# Patient Record
Sex: MALE | Race: WHITE
[De-identification: names, ages, dates, MRNs, and addresses within clinical notes are randomized per-mention and may not be internally consistent; named-entity substitution may affect disease eponyms.]

---

## 2019-08-01 ENCOUNTER — HOSPITAL ENCOUNTER (INPATIENT)
Dept: HOSPITAL 91 - E/R | Age: 59
LOS: 5 days | Discharge: HOME HEALTH | DRG: 264 | End: 2019-08-06
Payer: COMMERCIAL

## 2019-08-01 ENCOUNTER — HOSPITAL ENCOUNTER (INPATIENT)
Dept: HOSPITAL 10 - E/R | Age: 59
LOS: 5 days | Discharge: HOME HEALTH | DRG: 264 | End: 2019-08-06
Attending: INTERNAL MEDICINE | Admitting: INTERNAL MEDICINE
Payer: COMMERCIAL

## 2019-08-01 VITALS
HEIGHT: 69 IN | WEIGHT: 235.89 LBS | HEIGHT: 69 IN | BODY MASS INDEX: 34.94 KG/M2 | WEIGHT: 235.89 LBS | BODY MASS INDEX: 34.94 KG/M2

## 2019-08-01 VITALS — HEART RATE: 100 BPM | SYSTOLIC BLOOD PRESSURE: 143 MMHG | DIASTOLIC BLOOD PRESSURE: 63 MMHG | RESPIRATION RATE: 18 BRPM

## 2019-08-01 DIAGNOSIS — E78.5: ICD-10-CM

## 2019-08-01 DIAGNOSIS — E11.8: ICD-10-CM

## 2019-08-01 DIAGNOSIS — N39.0: ICD-10-CM

## 2019-08-01 DIAGNOSIS — L03.115: ICD-10-CM

## 2019-08-01 DIAGNOSIS — E11.40: ICD-10-CM

## 2019-08-01 DIAGNOSIS — E87.5: ICD-10-CM

## 2019-08-01 DIAGNOSIS — D63.8: ICD-10-CM

## 2019-08-01 DIAGNOSIS — Z99.2: ICD-10-CM

## 2019-08-01 DIAGNOSIS — E66.9: ICD-10-CM

## 2019-08-01 DIAGNOSIS — E11.22: ICD-10-CM

## 2019-08-01 DIAGNOSIS — I73.9: ICD-10-CM

## 2019-08-01 DIAGNOSIS — N18.6: ICD-10-CM

## 2019-08-01 DIAGNOSIS — E11.621: ICD-10-CM

## 2019-08-01 DIAGNOSIS — E11.52: Primary | ICD-10-CM

## 2019-08-01 DIAGNOSIS — I12.0: ICD-10-CM

## 2019-08-01 LAB
ADD MAN DIFF?: NO
ADD UMIC: YES
ALANINE AMINOTRANSFERASE: 57 IU/L (ref 13–69)
ALBUMIN/GLOBULIN RATIO: 1.15
ALBUMIN: 4.6 G/DL (ref 3.3–4.9)
ALKALINE PHOSPHATASE: 127 IU/L (ref 42–121)
ANION GAP: 17 (ref 5–13)
ASPARTATE AMINO TRANSFERASE: 37 IU/L (ref 15–46)
BASOPHIL #: 0 10^3/UL (ref 0–0.1)
BASOPHILS %: 0.3 % (ref 0–2)
BILIRUBIN,DIRECT: 0 MG/DL (ref 0–0.2)
BILIRUBIN,TOTAL: 0.2 MG/DL (ref 0.2–1.3)
BLOOD UREA NITROGEN: 58 MG/DL (ref 7–20)
CALCIUM: 9.8 MG/DL (ref 8.4–10.2)
CARBON DIOXIDE: 22 MMOL/L (ref 21–31)
CHLORIDE: 99 MMOL/L (ref 97–110)
CREATININE: 7.48 MG/DL (ref 0.61–1.24)
EOSINOPHILS #: 0.2 10^3/UL (ref 0–0.5)
EOSINOPHILS %: 1.9 % (ref 0–7)
GLOBULIN: 4 G/DL (ref 1.3–3.2)
GLUCOSE: 229 MG/DL (ref 70–220)
HEMATOCRIT: 32.1 % (ref 42–52)
HEMOGLOBIN: 10.6 G/DL (ref 14–18)
IMMATURE GRANS #M: 0.23 10^3/UL (ref 0–0.03)
IMMATURE GRANS % (M): 2.4 % (ref 0–0.43)
INR: 0.94
LYMPHOCYTES #: 1.2 10^3/UL (ref 0.8–2.9)
LYMPHOCYTES %: 12.7 % (ref 15–51)
MEAN CORPUSCULAR HEMOGLOBIN: 30.5 PG (ref 29–33)
MEAN CORPUSCULAR HGB CONC: 33 G/DL (ref 32–37)
MEAN CORPUSCULAR VOLUME: 92.2 FL (ref 82–101)
MEAN PLATELET VOLUME: 9.8 FL (ref 7.4–10.4)
MONOCYTE #: 1.4 10^3/UL (ref 0.3–0.9)
MONOCYTES %: 15.1 % (ref 0–11)
NEUTROPHIL #: 6.4 10^3/UL (ref 1.6–7.5)
NEUTROPHILS %: 67.6 % (ref 39–77)
NUCLEATED RED BLOOD CELLS #: 0 10^3/UL (ref 0–0)
NUCLEATED RED BLOOD CELLS%: 0 /100WBC (ref 0–0)
PARTIAL THROMBOPLASTIN TIME: 30.9 SEC (ref 23–35)
PLATELET COUNT: 259 10^3/UL (ref 140–415)
POTASSIUM: 4.9 MMOL/L (ref 3.5–5.1)
PROTIME: 12.7 SEC (ref 11.9–14.9)
PT RATIO: 1
RED BLOOD COUNT: 3.48 10^6/UL (ref 4.7–6.1)
RED CELL DISTRIBUTION WIDTH: 14.5 % (ref 11.5–14.5)
SODIUM: 138 MMOL/L (ref 135–144)
TOTAL PROTEIN: 8.6 G/DL (ref 6.1–8.1)
UR AMORPHOUS CRYSTAL: (no result) /HPF
UR ASCORBIC ACID: NEGATIVE MG/DL
UR BACTERIA: (no result) /HPF
UR BILIRUBIN (DIP): NEGATIVE MG/DL
UR BLOOD (DIP): (no result) MG/DL
UR CLARITY: (no result)
UR COLOR: YELLOW
UR GLUCOSE (DIP): (no result) MG/DL
UR KETONES (DIP): NEGATIVE MG/DL
UR LEUKOCYTE ESTERASE (DIP): NEGATIVE LEU/UL
UR NITRITE (DIP): NEGATIVE MG/DL
UR PH (DIP): 5 (ref 5–9)
UR RBC: 15 /HPF (ref 0–5)
UR SPECIFIC GRAVITY (DIP): 1.02 (ref 1–1.03)
UR SQUAMOUS EPITHELIAL CELL: (no result) /HPF
UR TOTAL PROTEIN (DIP): (no result) MG/DL
UR UROBILINOGEN (DIP): NEGATIVE MG/DL
UR WBC: 6 /HPF (ref 0–5)
WHITE BLOOD COUNT: 9.5 10^3/UL (ref 4.8–10.8)

## 2019-08-01 PROCEDURE — 81001 URINALYSIS AUTO W/SCOPE: CPT

## 2019-08-01 PROCEDURE — 73630 X-RAY EXAM OF FOOT: CPT

## 2019-08-01 PROCEDURE — 96375 TX/PRO/DX INJ NEW DRUG ADDON: CPT

## 2019-08-01 PROCEDURE — 80202 ASSAY OF VANCOMYCIN: CPT

## 2019-08-01 PROCEDURE — 80048 BASIC METABOLIC PNL TOTAL CA: CPT

## 2019-08-01 PROCEDURE — 87340 HEPATITIS B SURFACE AG IA: CPT

## 2019-08-01 PROCEDURE — 85025 COMPLETE CBC W/AUTO DIFF WBC: CPT

## 2019-08-01 PROCEDURE — 73718 MRI LOWER EXTREMITY W/O DYE: CPT

## 2019-08-01 PROCEDURE — 83540 ASSAY OF IRON: CPT

## 2019-08-01 PROCEDURE — 83036 HEMOGLOBIN GLYCOSYLATED A1C: CPT

## 2019-08-01 PROCEDURE — 93005 ELECTROCARDIOGRAM TRACING: CPT

## 2019-08-01 PROCEDURE — 87086 URINE CULTURE/COLONY COUNT: CPT

## 2019-08-01 PROCEDURE — 90935 HEMODIALYSIS ONE EVALUATION: CPT

## 2019-08-01 PROCEDURE — 85730 THROMBOPLASTIN TIME PARTIAL: CPT

## 2019-08-01 PROCEDURE — 99285 EMERGENCY DEPT VISIT HI MDM: CPT

## 2019-08-01 PROCEDURE — 84100 ASSAY OF PHOSPHORUS: CPT

## 2019-08-01 PROCEDURE — 85610 PROTHROMBIN TIME: CPT

## 2019-08-01 PROCEDURE — 82962 GLUCOSE BLOOD TEST: CPT

## 2019-08-01 PROCEDURE — 80053 COMPREHEN METABOLIC PANEL: CPT

## 2019-08-01 PROCEDURE — 83735 ASSAY OF MAGNESIUM: CPT

## 2019-08-01 PROCEDURE — 84560 ASSAY OF URINE/URIC ACID: CPT

## 2019-08-01 PROCEDURE — 87070 CULTURE OTHR SPECIMN AEROBIC: CPT

## 2019-08-01 PROCEDURE — 96374 THER/PROPH/DIAG INJ IV PUSH: CPT

## 2019-08-01 PROCEDURE — 82652 VIT D 1 25-DIHYDROXY: CPT

## 2019-08-01 PROCEDURE — 93926 LOWER EXTREMITY STUDY: CPT

## 2019-08-01 PROCEDURE — 87040 BLOOD CULTURE FOR BACTERIA: CPT

## 2019-08-01 RX ADMIN — VANCOMYCIN HYDROCHLORIDE 1 MLS/HR: 1 INJECTION, POWDER, LYOPHILIZED, FOR SOLUTION INTRAVENOUS at 19:34

## 2019-08-01 RX ADMIN — PIPERACILLIN SODIUM AND TAZOBACTAM SODIUM 1 MLS/HR: 3; .375 INJECTION, POWDER, LYOPHILIZED, FOR SOLUTION INTRAVENOUS at 18:14

## 2019-08-01 NOTE — ERD
ER Documentation


Chief Complaint


Chief Complaint





SEND BY PMD FOR ADMISSION, HAS RIGHT FOOT INFECTION





HPI


This is a very pleasant 59-year-old male with a history of hypertension and 


insulin-dependent diabetes mellitus.  The patient indicates that roughly 1 week 


ago he developed a redness and ulcer on the right foot.  He was seen 4 days ago 


the vascular surgeon Dr. Josue.  He was seen today for reevaluation and Dr. Deutsch's office and was sent to the emergency department to be admitted for 


further antibiotics as his wound ulcer had worsened and appeared more red 


swollen and tender to the patient.  He has had no fevers or shaking no chills.  


He denies any chest pain.  He has no shortness of breath at rest or exertion.





ROS


All systems reviewed and are negative except as per history of present illness.





Medications


Home Meds


Reported Medications


Multivit/Ca Carb/B Cmplx/Fa* (Yen-Barbara*) 1 Tab Tab, 1 TAB PO DAILY, TAB


   8/1/19


Furosemide* (Furosemide*) 40 Mg Tablet, 40 MG PO DAILY, TAB


   8/1/19


Atorvastatin Calcium* (Atorvastatin Calcium*) 20 Mg Tablet, 20 MG PO QHS, #30 


TAB


   8/1/19


Amlodipine Besylate* (Norvasc*) 5 Mg Tablet, 5 MG PO DAILY, TAB


   8/1/19


Minoxidil* (Lonitin*) 10 Mg Tab, 10 MG PO DAILY, TAB


   8/1/19


Docusate Sodium* (Colace*) 100 Mg Capsule, 200 MG PO DAILY, #30 CAP


   8/1/19


Omeprazole* (Omeprazole*) 20 Mg Capsule.dr, 20 MG PO DAILY, #30 CAP


   8/1/19


Sitagliptin* (Januvia*) 50 Mg Tablet, 50 MG PO DAILY, #30 TAB


   8/1/19


Insulin Glargine* (Lantus*) 100 Unit/Ml Soln, 40 UNIT SC BID, #1 VIAL


   8/1/19


Insulin Aspart* (Novolog Insulin Pen*) 100 Unit/Ml Soln, 0 SC .SLIDING SCALE AC,


EA


   8/1/19


Calcifediol (Rayaldee) 30 Mcg Cap.sa.24h, 30 MCG PO DAILY


   8/1/19


Levofloxacin* (Levaquin*) 500 Mg Tablet, 500 MG PO DAILY, TAB


   FOR 10 DAYS, START DATE 7/29/19 8/1/19


Discontinued Reported Medications


Minoxidil* (Lonitin*) 2.5 Mg Tab, 5 MG PO QPM, TAB


   5/16/15


Docusate Sodium* (Docusate Sodium*) 100 Mg Capsule, 100 MG PO QAM, CAP


   5/16/15


Fenofibrate, Micronized (Fenofibrate) 134 Mg Capsule, 134 MG PO DAILY, CAP


   5/16/15


Omeprazole* (Omeprazole*) 20 Mg Capsule.dr, 20 MG PO DAILY, CAP


   5/16/15


Metformin* (Glucophage*) 1,000 Mg Tablet, 1000 MG PO BID, TAB


   5/16/15


Discontinued Scripts


Insulin Glargine* (Lantus*) 100 Unit/Ml Soln, 20 UNIT SC QHS for 28 Days, VIAL


   Prov:CARL BROWN MD         6/4/15


Losartan Potassium* (Cozaar*) 25 Mg Tab, 25 MG GTB DAILY for 28 Days, BOTTLE


   Prov:CARL BROWN MD         6/4/15


Metoprolol Tartrate* (Lopressor*) 25 Mg Tab, 50 MG PO BID for 28 Days, TAB


   Prov:CARL BROWN MD         6/4/15


Insulin Aspart* (Novolog Insulin Pen*) 100 Unit/Ml Soln, 8 UNIT SC WITH MEALS 


for 28 Days, VIAL


   Prov:CARL BROWN MD         6/4/15


Atorvastatin Calcium* (Atorvastatin Calcium*) 20 Mg Tablet, 20 MG PO HS, #60 TAB


   Prov:JIM GRAJEDA MD         5/18/15





Allergies


Allergies:  


Coded Allergies:  


     No Known Allergies (Verified  Allergy, Mild, 8/1/19)





PMhx/Soc


History of Surgery:  Yes (exp. lap., colostomy)


Anesthesia Reaction:  No


Hx Neurological Disorder:  No


Hx Respiratory Disorders:  No


Hx Cardiac Disorders:  Yes (HTN)


Hx Psychiatric Problems:  No


Hx Miscellaneous Medical Probl:  Yes (DM, HTN, PAD acute RF, CKD, diab 


neuropathy, gangrene, diverticulitis)


Hx Alcohol Use:  No


Hx Substance Use:  No


Hx Tobacco Use:  No


Smoking Status:  Never smoker





Physical Exam


Vitals





Vital Signs


  Date      Temp  Pulse  Resp  B/P (MAP)   Pulse Ox  O2          O2 Flow    FiO2


Time                                                 Delivery    Rate


    8/1/19          105    23      165/74        99  Room Air


     19:00                          (104)


    8/1/19  99.8    100    18      151/79        99


     16:07                          (103)





Physical Exam


Constitutional:Well-developed. Well-nourished.


HEENT:Normocephalic. Atraumatic.Pupils were equal round reactive to light. Moist


mucous membranes.


Respiratory: Not using accessory muscles of respiration.Lungs were clear to 


auscultation bilaterally. No rhonchi. No rales. No wheezing. 


Cardiovascular: Regular rate regular rhythm.No murmurs. No rubs were 


appreciated.S1, S2 normal.  Dorsalis pedis and posterior tibialis were 


diminished on the right compared to the left.


GI: Abdomen was soft. Nontender. Non Distended. No pulsatile abdominal masses or


bruits. No rebound. No guarding. Bowel sounds were present and normal. 


Muscle skeletal: Full range of motion of both the upper and lower extremities 


bilaterally.Normal muscle tone.No assymetrical calf tenderness or swelling. 


Skin: Erythremia warmth and tenderness extending over the dorsal aspect of the 


right foot with central area of stage I ulcer roughly 1 cm in diameter.  No 


fluctuance or induration.  No subcutaneous emphysema.  Pain not out of 


proportion to physical exam findings.


NEURO: Patient was alert, awake, orientated x3.No facial droop. Gait observed 


and normal with no ataxia.Speech had regular rate and rhythm. No focal neurologi


johana deficits.


Result Diagram:  


8/1/19 1749 8/1/19 1749





Results 24 hrs





Laboratory Tests


              Test
                                  8/1/19
17:49


              White Blood Count                      9.5 10^3/ul


              Red Blood Count                       3.48 10^6/ul


              Hemoglobin                               10.6 g/dl


              Hematocrit                                  32.1 %


              Mean Corpuscular Volume                    92.2 fl


              Mean Corpuscular Hemoglobin                30.5 pg


              Mean Corpuscular Hemoglobin
Concent     33.0 g/dl 



              Red Cell Distribution Width                 14.5 %


              Platelet Count                         259 10^3/UL


              Mean Platelet Volume                        9.8 fl


              Immature Granulocytes %                    2.400 %


              Neutrophils %                               67.6 %


              Lymphocytes %                               12.7 %


              Monocytes %                                 15.1 %


              Eosinophils %                                1.9 %


              Basophils %                                  0.3 %


              Nucleated Red Blood Cells %            0.0 /100WBC


              Immature Granulocytes #              0.230 10^3/ul


              Neutrophils #                          6.4 10^3/ul


              Lymphocytes #                          1.2 10^3/ul


              Monocytes #                            1.4 10^3/ul


              Eosinophils #                          0.2 10^3/ul


              Basophils #                            0.0 10^3/ul


              Nucleated Red Blood Cells #            0.0 10^3/ul


              Prothrombin Time                          12.7 Sec


              Prothrombin Time Ratio                         1.0


              INR International Normalized
Ratio           0.94 



              Activated Partial
Thromboplast Time      30.9 Sec 



              Sodium Level                            138 mmol/L


              Potassium Level                         4.9 mmol/L


              Chloride Level                           99 mmol/L


              Carbon Dioxide Level                     22 mmol/L


              Anion Gap                                       17


              Blood Urea Nitrogen                       58 mg/dl


              Creatinine                              7.48 mg/dl


              Est Glomerular Filtrat Rate
mL/min       7 mL/min 



              Glucose Level                            229 mg/dl


              Calcium Level                            9.8 mg/dl


              Total Bilirubin                          0.2 mg/dl


              Direct Bilirubin                        0.00 mg/dl


              Indirect Bilirubin                       0.2 mg/dl


              Aspartate Amino Transf
(AST/SGOT)         37 IU/L 



              Alanine Aminotransferase
(ALT/SGPT)       57 IU/L 



              Alkaline Phosphatase                      127 IU/L


              Total Protein                             8.6 g/dl


              Albumin                                   4.6 g/dl


              Globulin                                 4.00 g/dl


              Albumin/Globulin Ratio                        1.15





Current Medications


 Medications
   Dose
          Sig/Petros
       Start Time
   Status  Last


 (Trade)       Ordered        Route
 PRN     Stop Time              Admin
Dose


                              Reason                                Admin


 Vancomycin     250 ml @ 
     ONCE  STAT
    8/1/19        DC       



HCl            125 mls/hr     IVPB
          17:16
 8/1/19


                                             19:15


 Piperacillin   100 ml @ 
     ONCE  STAT
    8/1/19        DC            8/1/19


Sod/
          200 mls/hr     IVPB
          17:16
 8/1/19                18:14



Tazobactam                                   17:45


Sod








Procedures/MDM


The patient presented to the emergency department with a spreading erythematous 


superficial infection of the skin and subcutaneous tissues. My differential 


diagnosis included but was not limited to necrotizing fasciitis, lymphangitis, 


thrombophlebitis, deep vein thrombosis, allergic reaction, neoplasm, gout or 


abscess.





Predisposing factors of the progressive spread of erythema, warmth, pain and 


tenderness was considered such as lymphedema, tinea pedis, open wounds, prior 


trauma or surgery, pre-existing skin lesion (furuncle), retained foreign body, 


injection drug use or vascular or immune compromise. 





The patient was placed on antibiotics to cover Staphylococcus aureus, including 


resistant strains such as community-acquired methicillin-resistant S. aureus.  


The patient was given intravenous cefepime and vancomycin.  There is no physical


exam findings to suggest necrotizing fasciitis, compartment syndrome my clinical


suspicion was low for ostium mellitus.  Radiographic imaging was obtained of the


right foot.  There is no secondary changes at this time to suggest osteo-


mellitus.


12 Lead EKG tracing ordered and reviewed by myself showed: 


Normal sinus rhythm of 94 bpm and no arrhythmia.


ID interval normal.


QRS duration normal.


No ST segment elevation


No ST segment depression. No changes consistent with acute ischemia. 





The patient had acute on chronic renal failure.  His BUN was 58 and creatinine 


was 7.48.  The patient was hyperglycemic without ketosis.  He received IV 


fluids.  He was given intravenous morphine and Zofran for analgesia control.  He


will be admitted under the care of Dr. Brown.  I do feel the patient was able to


go to the medical surgical floor.





Departure


Diagnosis:  


   Primary Impression:  


   Diabetic foot ulcer


   Diabetic foot ulcer location:  midfoot  Diabetes mellitus type:  other 


   specified (including CONNOR)  Laterality:  right  Non-pressure ulcer stage:  


   limited to breakdown of skin  Qualified Codes:  E13.621 - Other specified 


   diabetes mellitus with foot ulcer; L97.411 - Non-pressure chronic ulcer of 


   right heel and midfoot limited to breakdown of skin


   Additional Impressions:  


   Renal failure (ARF), acute on chronic


   Acute renal failure type:  unspecified  Chronic kidney disease stage:  


   unspecified stage  Qualified Codes:  N17.9 - Acute kidney failure, 


   unspecified; N18.9 - Chronic kidney disease, unspecified


   Hyperglycemia without ketosis


Condition:  Serious











SARATH SANCHEZ MD             Aug 1, 2019 19:34

## 2019-08-02 VITALS — SYSTOLIC BLOOD PRESSURE: 130 MMHG | DIASTOLIC BLOOD PRESSURE: 58 MMHG | HEART RATE: 94 BPM

## 2019-08-02 VITALS — HEART RATE: 93 BPM | SYSTOLIC BLOOD PRESSURE: 144 MMHG | DIASTOLIC BLOOD PRESSURE: 62 MMHG

## 2019-08-02 VITALS — RESPIRATION RATE: 18 BRPM | HEART RATE: 104 BPM | SYSTOLIC BLOOD PRESSURE: 120 MMHG | DIASTOLIC BLOOD PRESSURE: 56 MMHG

## 2019-08-02 VITALS — SYSTOLIC BLOOD PRESSURE: 123 MMHG | HEART RATE: 94 BPM | DIASTOLIC BLOOD PRESSURE: 60 MMHG

## 2019-08-02 VITALS — HEART RATE: 96 BPM | SYSTOLIC BLOOD PRESSURE: 147 MMHG | DIASTOLIC BLOOD PRESSURE: 55 MMHG

## 2019-08-02 VITALS — DIASTOLIC BLOOD PRESSURE: 52 MMHG | SYSTOLIC BLOOD PRESSURE: 132 MMHG | HEART RATE: 96 BPM

## 2019-08-02 VITALS — SYSTOLIC BLOOD PRESSURE: 111 MMHG | HEART RATE: 95 BPM | DIASTOLIC BLOOD PRESSURE: 63 MMHG

## 2019-08-02 VITALS — RESPIRATION RATE: 16 BRPM | HEART RATE: 103 BPM | DIASTOLIC BLOOD PRESSURE: 63 MMHG | SYSTOLIC BLOOD PRESSURE: 145 MMHG

## 2019-08-02 VITALS — DIASTOLIC BLOOD PRESSURE: 63 MMHG | SYSTOLIC BLOOD PRESSURE: 131 MMHG | HEART RATE: 108 BPM | RESPIRATION RATE: 18 BRPM

## 2019-08-02 VITALS — DIASTOLIC BLOOD PRESSURE: 60 MMHG | HEART RATE: 94 BPM | SYSTOLIC BLOOD PRESSURE: 145 MMHG

## 2019-08-02 VITALS — SYSTOLIC BLOOD PRESSURE: 110 MMHG | HEART RATE: 93 BPM | DIASTOLIC BLOOD PRESSURE: 53 MMHG

## 2019-08-02 VITALS — HEART RATE: 98 BPM | SYSTOLIC BLOOD PRESSURE: 125 MMHG | DIASTOLIC BLOOD PRESSURE: 58 MMHG | RESPIRATION RATE: 17 BRPM

## 2019-08-02 VITALS — HEART RATE: 100 BPM

## 2019-08-02 VITALS — RESPIRATION RATE: 18 BRPM | HEART RATE: 99 BPM | DIASTOLIC BLOOD PRESSURE: 66 MMHG | SYSTOLIC BLOOD PRESSURE: 142 MMHG

## 2019-08-02 LAB
ABNORMAL IP MESSAGE: 1
ADD MAN DIFF?: NO
ANION GAP: 16 (ref 5–13)
BASOPHIL #: 0 10^3/UL (ref 0–0.1)
BASOPHILS %: 0.2 % (ref 0–2)
BLOOD UREA NITROGEN: 67 MG/DL (ref 7–20)
CALCIUM: 9 MG/DL (ref 8.4–10.2)
CARBON DIOXIDE: 22 MMOL/L (ref 21–31)
CHLORIDE: 101 MMOL/L (ref 97–110)
CREATININE: 8.2 MG/DL (ref 0.61–1.24)
EOSINOPHILS #: 0.1 10^3/UL (ref 0–0.5)
EOSINOPHILS %: 1.3 % (ref 0–7)
GLUCOSE: 281 MG/DL (ref 70–220)
HEMATOCRIT: 29.2 % (ref 42–52)
HEMOGLOBIN: 9.7 G/DL (ref 14–18)
HEPATITIS B SURFACE ANTIGEN: NEGATIVE
IMMATURE GRANS #M: 0.18 10^3/UL (ref 0–0.03)
IMMATURE GRANS % (M): 2.2 % (ref 0–0.43)
LYMPHOCYTES #: 0.3 10^3/UL (ref 0.8–2.9)
LYMPHOCYTES %: 3.6 % (ref 15–51)
MEAN CORPUSCULAR HEMOGLOBIN: 31 PG (ref 29–33)
MEAN CORPUSCULAR HGB CONC: 33.2 G/DL (ref 32–37)
MEAN CORPUSCULAR VOLUME: 93.3 FL (ref 82–101)
MEAN PLATELET VOLUME: 9.4 FL (ref 7.4–10.4)
MONOCYTE #: 0.7 10^3/UL (ref 0.3–0.9)
MONOCYTES %: 9 % (ref 0–11)
NEUTROPHIL #: 6.9 10^3/UL (ref 1.6–7.5)
NEUTROPHILS %: 83.7 % (ref 39–77)
NUCLEATED RED BLOOD CELLS #: 0 10^3/UL (ref 0–0)
NUCLEATED RED BLOOD CELLS%: 0 /100WBC (ref 0–0)
PLATELET COUNT: 226 10^3/UL (ref 140–415)
POSITIVE DIFF: (no result)
POTASSIUM: 5.4 MMOL/L (ref 3.5–5.1)
RED BLOOD COUNT: 3.13 10^6/UL (ref 4.7–6.1)
RED CELL DISTRIBUTION WIDTH: 14.4 % (ref 11.5–14.5)
SODIUM: 139 MMOL/L (ref 135–144)
WHITE BLOOD COUNT: 8.3 10^3/UL (ref 4.8–10.8)

## 2019-08-02 PROCEDURE — 5A1D70Z PERFORMANCE OF URINARY FILTRATION, INTERMITTENT, LESS THAN 6 HOURS PER DAY: ICD-10-PCS | Performed by: INTERNAL MEDICINE

## 2019-08-02 RX ADMIN — COLLAGENASE SANTYL SCH APPLIC: 250 OINTMENT TOPICAL at 17:30

## 2019-08-02 RX ADMIN — ATORVASTATIN CALCIUM 1 MG: 20 TABLET, FILM COATED ORAL at 20:28

## 2019-08-02 RX ADMIN — TIMOLOL MALEATE SCH DROP: 5 SOLUTION/ DROPS OPHTHALMIC at 08:50

## 2019-08-02 RX ADMIN — CEFTRIAXONE 1 MLS/HR: 1 INJECTION, SOLUTION INTRAVENOUS at 01:17

## 2019-08-02 RX ADMIN — CEFTRIAXONE SCH MLS/HR: 1 INJECTION, SOLUTION INTRAVENOUS at 01:17

## 2019-08-02 RX ADMIN — INSULIN GLARGINE SCH UNITS: 100 INJECTION, SOLUTION SUBCUTANEOUS at 08:05

## 2019-08-02 RX ADMIN — LINAGLIPTIN 1 MG: 5 TABLET, FILM COATED ORAL at 17:34

## 2019-08-02 RX ADMIN — COLLAGENASE SANTYL 1 APPLIC: 250 OINTMENT TOPICAL at 17:30

## 2019-08-02 RX ADMIN — TIMOLOL MALEATE 1 DROP: 5 SOLUTION/ DROPS OPHTHALMIC at 08:50

## 2019-08-02 RX ADMIN — INSULIN GLARGINE 1 UNITS: 100 INJECTION, SOLUTION SUBCUTANEOUS at 20:30

## 2019-08-02 RX ADMIN — INSULIN GLARGINE SCH UNITS: 100 INJECTION, SOLUTION SUBCUTANEOUS at 20:30

## 2019-08-02 RX ADMIN — INSULIN GLARGINE 1 UNITS: 100 INJECTION, SOLUTION SUBCUTANEOUS at 08:05

## 2019-08-02 RX ADMIN — DOCUSATE SODIUM 1 MG: 100 CAPSULE, LIQUID FILLED ORAL at 08:50

## 2019-08-02 RX ADMIN — TIMOLOL MALEATE SCH DROP: 5 SOLUTION/ DROPS OPHTHALMIC at 20:28

## 2019-08-02 RX ADMIN — ZOLPIDEM TARTRATE 1 MG: 5 TABLET, FILM COATED ORAL at 01:17

## 2019-08-02 RX ADMIN — ATORVASTATIN CALCIUM SCH MG: 20 TABLET, FILM COATED ORAL at 20:28

## 2019-08-02 RX ADMIN — FUROSEMIDE 1 MG: 40 TABLET ORAL at 08:49

## 2019-08-02 RX ADMIN — INSULIN ASPART 1 UNIT: 100 INJECTION, SOLUTION INTRAVENOUS; SUBCUTANEOUS at 17:38

## 2019-08-02 RX ADMIN — LINAGLIPTIN SCH MG: 5 TABLET, FILM COATED ORAL at 17:34

## 2019-08-02 RX ADMIN — ENOXAPARIN SODIUM 1 MG: 100 INJECTION SUBCUTANEOUS at 08:53

## 2019-08-02 RX ADMIN — MINOXIDIL 1 MG: 10 TABLET ORAL at 08:48

## 2019-08-02 RX ADMIN — MINOXIDIL SCH MG: 10 TABLET ORAL at 08:48

## 2019-08-02 RX ADMIN — INSULIN ASPART 1 UNIT: 100 INJECTION, SOLUTION INTRAVENOUS; SUBCUTANEOUS at 20:31

## 2019-08-02 RX ADMIN — INSULIN ASPART 1 UNIT: 100 INJECTION, SOLUTION INTRAVENOUS; SUBCUTANEOUS at 17:39

## 2019-08-02 RX ADMIN — TIMOLOL MALEATE 1 DROP: 5 SOLUTION/ DROPS OPHTHALMIC at 20:28

## 2019-08-02 RX ADMIN — PANTOPRAZOLE SODIUM 1 MG: 40 TABLET, DELAYED RELEASE ORAL at 16:34

## 2019-08-02 RX ADMIN — VANCOMYCIN HYDROCHLORIDE 1 MLS/HR: 1 INJECTION, POWDER, LYOPHILIZED, FOR SOLUTION INTRAVENOUS at 16:34

## 2019-08-02 RX ADMIN — AMLODIPINE BESYLATE SCH MG: 5 TABLET ORAL at 08:49

## 2019-08-02 RX ADMIN — AMLODIPINE BESYLATE 1 MG: 5 TABLET ORAL at 08:49

## 2019-08-02 RX ADMIN — INSULIN ASPART 1 UNIT: 100 INJECTION, SOLUTION INTRAVENOUS; SUBCUTANEOUS at 12:19

## 2019-08-02 RX ADMIN — INSULIN ASPART 1 UNIT: 100 INJECTION, SOLUTION INTRAVENOUS; SUBCUTANEOUS at 08:05

## 2019-08-02 RX ADMIN — DOCUSATE SODIUM SCH MG: 100 CAPSULE, LIQUID FILLED ORAL at 08:50

## 2019-08-02 RX ADMIN — ZOLPIDEM TARTRATE PRN MG: 5 TABLET, FILM COATED ORAL at 01:17

## 2019-08-02 RX ADMIN — Medication 1 TAB: at 08:49

## 2019-08-02 RX ADMIN — PANTOPRAZOLE SODIUM SCH MG: 40 TABLET, DELAYED RELEASE ORAL at 16:34

## 2019-08-02 RX ADMIN — FUROSEMIDE SCH MG: 40 TABLET ORAL at 08:49

## 2019-08-02 NOTE — PN
Date/Time of Note


Date/Time of Note


DATE: 8/2/19 


TIME: 12:41





Assessment/Plan


Lines/Catheters


IV Catheter Type (from Nrsg):  Saline Lock





Assessment/Plan


Assessment/Plan


R foot cellulitis, small wound improving with antibiotics


I asked Dr. Plata to see him for podiatric evaluation





Subjective


24 Hr Interval Summary


No c/o. R foot pain has mostly resolved.





Exam/Review of Systems


Vital Signs


Vitals





Vital Signs


  Date      Temp  Pulse  Resp  B/P (MAP)   Pulse Ox  O2          O2 Flow    FiO2


Time                                                 Delivery    Rate


    8/2/19  98.3    104    18      120/56        93  Room Air


     07:15                           (77)








Intake and Output





8/1/19 8/1/19 8/2/19





1515:00


23:00


07:00





IntakeIntake Total


290 ml





OutputOutput Total


300 ml





BalanceBalance


-10 ml














Exam


Free Text/Dictation


R dorsal foot erythema is less red, no longer blanching, non tender, no 


drainage, dry eschar





Results


Result Diagram:  


8/2/19 0523                                                                     


          8/2/19 0523














LOLY TABOR MD                Aug 2, 2019 12:43

## 2019-08-02 NOTE — HP
TYSON HARVEY NP 8/2/19 1357:


Date/Time of Note


Date/Time of Note


DATE: 8/2/19 


TIME: 13:57





Assessment/Plan


VTE Prophylaxis


Pharmacological prophylaxis:  LMWH





Lines/Catheters


IV Catheter Type (from Nrs):  Saline Lock





Assessment/Plan


Hospital Course


1. right foot cellulitis with necrotic wound, pt has redness and edema only for 


5 days. Suspected PAD. 


2. Hypertension, 


3. Obesity,  


4. Diabetes mellitus type II , BS is uncontrolled


5. ESRD on HD services, MWF


6. Anemia chronic disease


7. Hyperkalemia


8. S/p abd. laparotomy,hx of diverticulosis


9.  s/p left foot 3 toe amputation,


10.  s.p bilateral lenses replacement,


11.  s/p left arm AV fistula creation


Assessment/Plan


-wound care


-insulin aspart with meals 5 units


-podiatry


-Dr Josue vascular surgeon


-DVT proph lovenox


-GI proph protonix


-phos level


-HD today


-hypoglycemic control


-start Tradjenta


-ID consult


Result Diagram:  


8/2/19 0523                                                                     


          8/2/19 0523





Results 24hrs





Laboratory Tests


Test
                     8/1/19
17:49  8/1/19
19:55  8/1/19
23:58  8/2/19
05:20


White Blood Count                9.5


Red Blood Count                3.48  L


Hemoglobin                     10.6  L


Hematocrit                     32.1  L


Mean Corpuscular Volume         92.2


Mean Corpuscular                30.5


Hemoglobin


Mean Corpuscular               33.0  
  
             
             



Hemoglobin
Concent


Red Cell Distribution           14.5


Width


Platelet Count                   259


Mean Platelet Volume             9.8


Immature Granulocytes %       2.400  H


Neutrophils %                   67.6


Lymphocytes %                  12.7  L


Monocytes %                    15.1  H


Eosinophils %                    1.9


Basophils %                      0.3


Nucleated Red Blood              0.0


Cells %


Immature Granulocytes #       0.230  H


Neutrophils #                    6.4


Lymphocytes #                    1.2


Monocytes #                     1.4  H


Eosinophils #                    0.2


Basophils #                      0.0


Nucleated Red Blood              0.0


Cells #


Prothrombin Time                12.7


Prothrombin Time Ratio           1.0


INR International              0.94  
  
             
             



Normalized
Ratio


Activated                      30.9  
  
             
             



Partial
Thromboplast


Time


Sodium Level                     138


Potassium Level                  4.9


Chloride Level                    99


Carbon Dioxide Level              22


Anion Gap                        17  H


Blood Urea Nitrogen              58  H


Creatinine                     7.48  H


Est Glomerular Filtrat           7  L
  
             
             



Rate
mL/min


Glucose Level                   229  H


Calcium Level                    9.8


Total Bilirubin                  0.2


Direct Bilirubin                0.00


Indirect Bilirubin               0.2


Aspartate Amino                  37  
  
             
             



Transf
(AST/SGOT)


Alanine                          57  
  
             
             



Aminotransferase
(ALT/SG


PT)


Alkaline Phosphatase            127  H


Total Protein                   8.6  H


Albumin                          4.6


Globulin                       4.00  H


Albumin/Globulin Ratio          1.15


Urine Color                             YELLOW


Urine Clarity                           CLOUDY  A


Urine pH                                       5.0


Urine Specific Gravity                       1.016


Urine Ketones                           NEGATIVE


Urine Nitrite                           NEGATIVE


Urine Bilirubin                         NEGATIVE


Urine Urobilinogen                      NEGATIVE


Urine Leukocyte Esterase                NEGATIVE


Urine Microscopic RBC                          15  H


Urine Microscopic WBC                           6  H


Urine Squamous            
             FEW  
        
             



Epithelial
Cells


Urine Amorphous Crystals                MODERATE


Urine Bacteria                          FEW  A


Urine Hemoglobin                               1+  H


Urine Glucose                                  1+  H


Urine Total Protein                            2+  H


Bedside Glucose                                              154


Hepatitis B Surface                                                 NEGATIVE


Antigen


Test
                     8/2/19
05:23  8/2/19
08:00  8/2/19
12:10  



White Blood Count                8.3


Red Blood Count                3.13  L


Hemoglobin                      9.7  L


Hematocrit                     29.2  L


Mean Corpuscular Volume         93.3


Mean Corpuscular                31.0


Hemoglobin


Mean Corpuscular               33.2  
  
             
             



Hemoglobin
Concent


Red Cell Distribution           14.4


Width


Platelet Count                   226


Mean Platelet Volume             9.4


Immature Granulocytes %       2.200  H


Neutrophils %                  83.7  H


Lymphocytes %                   3.6  L


Monocytes %                      9.0


Eosinophils %                    1.3


Basophils %                      0.2


Nucleated Red Blood              0.0


Cells %


Immature Granulocytes #       0.180  H


Neutrophils #                    6.9


Lymphocytes #                   0.3  L


Monocytes #                      0.7


Eosinophils #                    0.1


Basophils #                      0.0


Nucleated Red Blood              0.0


Cells #


Sodium Level                     139


Potassium Level                 5.4  H


Chloride Level                   101


Carbon Dioxide Level              22


Anion Gap                        16  H


Blood Urea Nitrogen              67  H


Creatinine                     8.20  H


Est Glomerular Filtrat           7  L
  
             
             



Rate
mL/min


Glucose Level                   281  H


Calcium Level                    9.0


Bedside Glucose                               274  H         218








HPI/ROS


Admit Date/Time


Admit Date/Time


Aug 1, 2019 at 19:35





Hx of Present Illness


This is  59-year-old male with a history of hypertension, obesity,  diabetes 


mellitus type II and fresh with HD services three times per week was sent from 


 [facility] office for the evaluation of the right foot edema.  The patient 


indicates that roughly 1 week ago he developed a redness and ulcer on the right 


foot.  He was seen by vascular surgeon Dr. Josue.  denied fevers, shaking no 


chills.  He denies any chest pain.  He has no shortness of breath at rest or 


exertion.





ROS


Constitutional: No fever, cough or chills.


EYE: cataract surgery


CARDIOVASCULAR:  No chest pain. No tachycardia. No Palpitation.


RESPIRATORY: No breathing problems. No COPD or disease of respiration.


GASTROINTESTINAL: No Nausea. No Vomiting. No constipation.has diverticulosis


Endocrine: has DM 


MUSCULO-SKELETAL: previous left foot surgeries, feet are called


NEUROLOGICAL: Alert oriented  in person, place, time and situation.  No 


Headache, Confusion. No Seizures. No problem with balance.





PMH/Family/Social


Past Medical History


Medications





Current Medications


Diagnostic Test (Pha) (Accu-Chek) 1 ea 02 XX ;  Start 8/2/19 at 02:00


Insulin Aspart (Novolog Insulin Pen) NOVOLOG *MODERATE* ALGORITHM WITH MEALS  


BEDTIME SC  Last administered on 8/2/19at 12:19; Admin Dose 4 UNIT;  Start 


8/2/19 at 08:00


Ceftriaxone Sodium 50 ml @  100 mls/hr Q24H IVPB  Last administered on 8/2/19at 


01:17; Admin Dose 100 MLS/HR;  Start 8/2/19 at 00:30


Acetaminophen (Tylenol Tab) 650 mg Q6H  PRN PO MILD PAIN(1-3)OR ELEVATED TEMP;  


Start 8/2/19 at 00:30


Zolpidem Tartrate (Ambien) 5 mg HS  PRN PO INSOMNIA Last administered on 


8/2/19at 01:17; Admin Dose 5 MG;  Start 8/2/19 at 00:30


Enoxaparin Sodium (Lovenox) 40 mg DAILY SC  Last administered on 8/2/19at 08:53;


Admin Dose 40 MG;  Start 8/2/19 at 09:00


Timolol Maleate (Timoptic 0.5%) 1 drop BID BOTH EYES  Last administered on 


8/2/19at 08:50; Admin Dose 1 DROP;  Start 8/2/19 at 09:00


Amlodipine Besylate (Norvasc) 5 mg DAILY PO  Last administered on 8/2/19at 


08:49; Admin Dose 5 MG;  Start 8/2/19 at 09:00


Atorvastatin Calcium (Lipitor) 20 mg QHS PO ;  Start 8/2/19 at 21:00


Docusate Sodium (Colace) 200 mg DAILY PO  Last administered on 8/2/19at 08:50; 


Admin Dose 200 MG;  Start 8/2/19 at 09:00


Furosemide (Lasix) 40 mg DAILY PO  Last administered on 8/2/19at 08:49; Admin 


Dose 40 MG;  Start 8/2/19 at 09:00


Insulin Glargine (Lantus) 40 units BID SC  Last administered on 8/2/19at 08:05; 


Admin Dose 40 UNITS;  Start 8/2/19 at 09:00


Minoxidil (Loniten) 10 mg DAILY PO  Last administered on 8/2/19at 08:48; Admin 


Dose 10 MG;  Start 8/2/19 at 09:00


Multivit/Ca Carb/ B Cmplx/FA/Prenat (Yen-Barbara) 1 tab DAILY PO  Last 


administered on 8/2/19at 08:49; Admin Dose 1 TAB;  Start 8/2/19 at 09:00


Non-Formulary Medication 1 ea BID BOTH EYES ;  Start 8/2/19 at 09:00;  Status 


UNV


Collagenase (Santyl) 1 applic DAILY TOP ;  Start 8/2/19 at 11:30


Miscellaneous Information 1 ea NOTE XX ;  Start 8/2/19 at 11:30


Glucose (Glutose) 15 gm Q15M  PRN PO DECREASED GLUCOSE;  Start 8/2/19 at 11:30


Glucose (Glutose) 22.5 gm Q15M  PRN PO DECREASED GLUCOSE;  Start 8/2/19 at 11:30


Dextrose (D50w Syringe) 25 ml Q15M  PRN IV DECREASED GLUCOSE;  Start 8/2/19 at 


11:30


Dextrose (D50w Syringe) 50 ml Q15M  PRN IV DECREASED GLUCOSE;  Start 8/2/19 at 


11:30


Glucagon (Glucagen) 1 mg Q15M  PRN IM DECREASED GLUCOSE;  Start 8/2/19 at 11:30


Glucose (Glutose) 15 gm Q15M  PRN BUCCAL DECREASED GLUCOSE;  Start 8/2/19 at 


11:30


Coded Allergies:  


     No Known Allergies (Verified  Allergy, Mild, 8/1/19)





Past Surgical History


Past Surgical Hx:  other (abd. laparotomy, s/p left foot 3 toe amputation, s.p 


bilateral lenses replacement, s/p left arm AV fistula)





Social History


Smoking Status:  Never smoker





Exam/Review of Systems


Vital Signs


Vitals





Vital Signs


  Date      Temp  Pulse  Resp  B/P (MAP)   Pulse Ox  O2          O2 Flow    FiO2


Time                                                 Delivery    Rate


    8/2/19  98.3    104    18      120/56        93  Room Air


     07:15                           (77)








Intake and Output





8/1/19 8/1/19 8/2/19





1515:00


23:00


07:00





IntakeIntake Total


290 ml





OutputOutput Total


300 ml





BalanceBalance


-10 ml














Exam


Exam


No acute distress, no events.


Eyes: anicteric, EOM's intact, no pallor, seen lens translucency bilateral


Nose: no rhinorrhea


Neck: supple, no thyromegaly, no carotid bruits


Lungs: clear bilaterally, decreased.


CVS: regular rate and rhythm, no murmurs


Abdomen: soft, bowel sounds present, no hepatosplenomegally, no masses, no 


rebound or guarding.obese


Rectal: differed.


External genitalia: no lesions.


Extremities: no edema, DP pulses are not palpable bilaterally, right foot re


dness and necrotic tissue, deformity 3 toe, left foot s/p amp, some dry blood


Neuro: alert and oriented x 3


Gait: normal


Motor strength: 5+/5+


Sensory exam: normal


Deep tendon reflexes: normal, Babisky reflexes are absent bilaterally


Skin: necrotic changes right foot





MIKAEL LUCERO MD 8/2/19 1527:


Assessment/Plan


IVETTE AND EXAMINED  WITH NP


 ESRD HD TODAY


PODIATRY CONSULT


IV ABX


MRI


Result Diagram:  


8/2/19 0523                                                                     


          8/2/19 0523








PMH/Family/Social


Past Medical History


Coded Allergies:  


     No Known Allergies (Verified  Allergy, Mild, 8/1/19)











TYSON HARVEY NP              Aug 2, 2019 13:57


MIKAEL LUCERO MD               Aug 2, 2019 15:27

## 2019-08-02 NOTE — CONS
DATE OF ADMISSION: 08/01/2019

DATE OF CONSULTATION:  08/02/2019

 

 

 

CHIEF COMPLAINT:  Right foot infection.

 

HISTORY OF PRESENT ILLNESS:  This is a 59-year-old gentleman with approximately 

2 weeks of swelling.  He states possibly he had a bug bite.  He developed a 

necrotic pus with surrounding cellulitis.  He was admitted for further 

evaluation and had initial cultures positive for Staph aureus.  The patient is 

currently awaiting dialysis.

 

PAST MEDICAL HISTORY:  Includes:

1.  Diabetes type 2 with peripheral neuropathy.

2.  Diabetes with nephropathy and end-stage renal disease on hemodialysis.

3.  Hypertension.

4.  Peripheral arterial disease.

5.  History of gangrene.

6.  Diverticulitis.

 

PAST SURGICAL HISTORY:  Multiple bilateral foot surgeries

 

SOCIAL HISTORY:  Denies any tobacco, alcohol use.

 

ALLERGIES:  NONE.

 

PHYSICAL EXAMINATION:

VITAL SIGNS:  Temperature is 98.3, pulse is 104, respiratory rate 18, blood 

pressure is 120/56, pulse ox is 93 on room air.

GENERAL:  The patient is alert, oriented, no acute distress, regular 

respiration.

EXTREMITIES:  The patient with a right foot dorsal aspect cellulitis.  There is 

a necrotic patch at the lateral aspect of the 1st metatarsal base.  Eschar 

appears dry fluctuance, 1+ pulse.  The DP, popliteal, 2+ femoral pulse swelling 

present in dorsal right foot.  No signs of pressure ulceration.

 

LABORATORIES:  WBC 8.3, hemoglobin 9.7, hematocrit 29.2, platelets 226.  Sodium 

139, potassium 5.4, chloride 101, CO2 of 22, BUN 67, creatinine 8.2.

 

DIAGNOSTIC DATA:  Right foot x-rays with recent changes at the 5th proximal 

interphalangeal joint soft tissue swelling, dorsal right foot swelling, 

calcaneal bone spurs, postsurgical changes to 2nd proximal interphalangeal 

joint.

 

ASSESSMENT:

1.  Right foot dry gangrene.

2.  Abscess.

3.  Cellulitis, right foot.

4.  Diabetes with peripheral neuropathy.

5.  Chronic kidney disease on hemodialysis.

 

PLAN:  Initial cultures are pending.  Prior outpatient revealed Staph aureus.  

The patient is currently on ceftriaxone.  I recommend a surgical incision and 

drainage.  We will schedule a mutual agreeable time tentatively tomorrow.  The 

patient will be kept n.p.o..  I discussed the planned procedure.  All questions 

were answered.  The patient is currently scheduled for dialysis today.

 

 

Dictated By: DUNCAN CURTIS/MOUNIKA

DD:    08/02/2019 14:17:42

DT:    08/02/2019 16:19:51

Conf#: [id number]

DID#:  [id number]

CC: CARL BROWN MD;*EndCC*

MTDD

## 2019-08-02 NOTE — CONS
DATE OF ADMISSION: 08/01/2019

DATE OF CONSULTATION:  08/02/2019

 

 

 

INFECTIOUS DISEASE CONSULTATION

 

REASON FOR CONSULTATION:  Antibiotic management.

 

HISTORY OF PRESENT ILLNESS:  Marco A Viera is a pleasant 59-year-old male who is admitted on 08/01/
2019 with a diabetic foot ulcer and is being seen for antibiotic management.  His past problems inclu
de hypertension, insulin-dependent diabetes mellitus.  About 1 week ago, he developed redness and ulc
er on the right foot.  He was seen 4 days prior to admission by Dr. Josue, vascular surgeon.  The pat
ient was then sent into the emergency room on further evaluation by Dr. Bass for antibiotic therapy h
is ulcer had worsened and become more swollen and red and tender.  He denies chest pain or shortness 
of breath at rest or exertion.

 

PAST MEDICAL HISTORY:  Essentially as outlined.

 

PAST SURGICAL HISTORY:  Had exploratory laparotomy and colostomy in the past.  As noted, he has diabe
kylah, hypertension, peripheral artery disease, acute renal failure, chronic renal disease.  The patien
t is on dialysis Monday, Wednesday and Friday, has an AV fistula in his left arm.  He has diabetic ne
uropathy and diverticulitis.

 

PHYSICAL EXAMINATION:

GENERAL:  He is disheveled.

VITAL SIGNS:  T-max is 99.8.

SKIN:  He has erythema, warmth and tenderness extending over the dorsal aspect of the right foot with
 a central area of eschar and a stage I ulcer about 1 cm in diameter without fluctuance or induration
.  No skin, subcutaneous emphysema.  Pain not out of proportion.

HEENT:  Within normal limits.

NECK:  Supple.

LYMPH NODES:  None palpable.

CHEST:  Decreased breath sounds at the bases.

HEART:  Without murmur or gallop.

ABDOMEN:  Soft, nontender, without organosplenomegaly or masses.

EXTREMITIES:  Without cyanosis, clubbing, or edema.

RECTAL AND GENITAL:  Deferred.

NEUROLOGIC:  No focal neurological abnormality.

 

LABORATORY DATA:  White count is 9.5, H and H of 10.6 and 32.1, platelet count 259,000.  BUN and crea
tinine 58/7.48 and glucose of 229.  The patient has 68% neutrophils.

 

IMPRESSION AND PLAN:  Patient was started on vancomycin and Zosyn.  The patient was seen by Dr. Neeta waller in podiatric consultation.  He noted right foot dry gangrene, abscess, cellulitis right foot, necr
otic patch of the lateral aspect of the first metatarsal base.  Eschar feels dry.  No fluctuance.  2+
 femoral pulse.  Swelling present in the dorsal right foot.  Cultures are pending.  Prior outpatient 
cultures revealed Staphylococcus aureus.  He recommended surgical incision and drainage, possibly for
 tomorrow.  It is planned for tomorrow.  He has had dialysis today.  He is on vancomycin, which will 
last 3 or 4 days and ceftriaxone.  We will continue him on current therapy.  I will dictate my findin
gs to the hospitalist.

 

 

Dictated By: JERROLD DREYER MD JD/MOUNIKA

DD:    08/02/2019 16:57:20

DT:    08/02/2019 20:03:03

Conf#: [id number]

DID#:  [id number]

## 2019-08-03 VITALS — DIASTOLIC BLOOD PRESSURE: 54 MMHG | SYSTOLIC BLOOD PRESSURE: 135 MMHG | HEART RATE: 92 BPM | RESPIRATION RATE: 18 BRPM

## 2019-08-03 VITALS — RESPIRATION RATE: 17 BRPM | HEART RATE: 88 BPM | DIASTOLIC BLOOD PRESSURE: 54 MMHG | SYSTOLIC BLOOD PRESSURE: 137 MMHG

## 2019-08-03 VITALS — HEART RATE: 97 BPM | DIASTOLIC BLOOD PRESSURE: 60 MMHG | RESPIRATION RATE: 19 BRPM | SYSTOLIC BLOOD PRESSURE: 120 MMHG

## 2019-08-03 VITALS — SYSTOLIC BLOOD PRESSURE: 123 MMHG | HEART RATE: 96 BPM | DIASTOLIC BLOOD PRESSURE: 54 MMHG

## 2019-08-03 VITALS — RESPIRATION RATE: 16 BRPM | DIASTOLIC BLOOD PRESSURE: 60 MMHG | SYSTOLIC BLOOD PRESSURE: 138 MMHG | HEART RATE: 88 BPM

## 2019-08-03 VITALS — HEART RATE: 90 BPM | DIASTOLIC BLOOD PRESSURE: 60 MMHG | RESPIRATION RATE: 18 BRPM | SYSTOLIC BLOOD PRESSURE: 143 MMHG

## 2019-08-03 VITALS — SYSTOLIC BLOOD PRESSURE: 138 MMHG | DIASTOLIC BLOOD PRESSURE: 64 MMHG | HEART RATE: 98 BPM

## 2019-08-03 VITALS — HEART RATE: 90 BPM | RESPIRATION RATE: 18 BRPM | SYSTOLIC BLOOD PRESSURE: 146 MMHG | DIASTOLIC BLOOD PRESSURE: 66 MMHG

## 2019-08-03 VITALS — DIASTOLIC BLOOD PRESSURE: 57 MMHG | HEART RATE: 92 BPM | RESPIRATION RATE: 17 BRPM | SYSTOLIC BLOOD PRESSURE: 140 MMHG

## 2019-08-03 VITALS — DIASTOLIC BLOOD PRESSURE: 56 MMHG | SYSTOLIC BLOOD PRESSURE: 140 MMHG | HEART RATE: 88 BPM | RESPIRATION RATE: 18 BRPM

## 2019-08-03 VITALS — SYSTOLIC BLOOD PRESSURE: 146 MMHG | RESPIRATION RATE: 18 BRPM | DIASTOLIC BLOOD PRESSURE: 66 MMHG | HEART RATE: 92 BPM

## 2019-08-03 VITALS — DIASTOLIC BLOOD PRESSURE: 57 MMHG | RESPIRATION RATE: 17 BRPM | SYSTOLIC BLOOD PRESSURE: 143 MMHG | HEART RATE: 92 BPM

## 2019-08-03 VITALS — RESPIRATION RATE: 16 BRPM | DIASTOLIC BLOOD PRESSURE: 63 MMHG | SYSTOLIC BLOOD PRESSURE: 144 MMHG | HEART RATE: 90 BPM

## 2019-08-03 VITALS — HEART RATE: 97 BPM | DIASTOLIC BLOOD PRESSURE: 44 MMHG | SYSTOLIC BLOOD PRESSURE: 124 MMHG

## 2019-08-03 VITALS — DIASTOLIC BLOOD PRESSURE: 58 MMHG | RESPIRATION RATE: 16 BRPM | HEART RATE: 88 BPM | SYSTOLIC BLOOD PRESSURE: 137 MMHG

## 2019-08-03 VITALS — RESPIRATION RATE: 16 BRPM | SYSTOLIC BLOOD PRESSURE: 143 MMHG | DIASTOLIC BLOOD PRESSURE: 65 MMHG | HEART RATE: 94 BPM

## 2019-08-03 VITALS — RESPIRATION RATE: 17 BRPM | DIASTOLIC BLOOD PRESSURE: 56 MMHG | SYSTOLIC BLOOD PRESSURE: 147 MMHG | HEART RATE: 90 BPM

## 2019-08-03 VITALS — RESPIRATION RATE: 18 BRPM | HEART RATE: 91 BPM | SYSTOLIC BLOOD PRESSURE: 142 MMHG | DIASTOLIC BLOOD PRESSURE: 65 MMHG

## 2019-08-03 VITALS — DIASTOLIC BLOOD PRESSURE: 55 MMHG | HEART RATE: 90 BPM | RESPIRATION RATE: 19 BRPM | SYSTOLIC BLOOD PRESSURE: 124 MMHG

## 2019-08-03 VITALS — RESPIRATION RATE: 18 BRPM | DIASTOLIC BLOOD PRESSURE: 55 MMHG | HEART RATE: 90 BPM | SYSTOLIC BLOOD PRESSURE: 147 MMHG

## 2019-08-03 VITALS — SYSTOLIC BLOOD PRESSURE: 136 MMHG | DIASTOLIC BLOOD PRESSURE: 55 MMHG | HEART RATE: 90 BPM | RESPIRATION RATE: 17 BRPM

## 2019-08-03 VITALS — SYSTOLIC BLOOD PRESSURE: 125 MMHG | HEART RATE: 96 BPM | DIASTOLIC BLOOD PRESSURE: 48 MMHG

## 2019-08-03 VITALS — HEART RATE: 99 BPM | SYSTOLIC BLOOD PRESSURE: 136 MMHG | DIASTOLIC BLOOD PRESSURE: 57 MMHG

## 2019-08-03 LAB
% IRON SATURATION: 20 % SAT (ref 22–52)
ADD MAN DIFF?: NO
ANION GAP: 14 (ref 5–13)
BASOPHIL #: 0 10^3/UL (ref 0–0.1)
BASOPHILS %: 0.3 % (ref 0–2)
BLOOD UREA NITROGEN: 44 MG/DL (ref 7–20)
CALCIUM: 9 MG/DL (ref 8.4–10.2)
CARBON DIOXIDE: 26 MMOL/L (ref 21–31)
CHLORIDE: 100 MMOL/L (ref 97–110)
CREATININE: 5.77 MG/DL (ref 0.61–1.24)
EOSINOPHILS #: 0.3 10^3/UL (ref 0–0.5)
EOSINOPHILS %: 3.8 % (ref 0–7)
GLUCOSE: 204 MG/DL (ref 70–220)
HEMATOCRIT: 29.7 % (ref 42–52)
HEMOGLOBIN A1C: 6.7 % (ref 0–5.9)
HEMOGLOBIN: 9.7 G/DL (ref 14–18)
IMMATURE GRANS #M: 0.19 10^3/UL (ref 0–0.03)
IMMATURE GRANS % (M): 2.9 % (ref 0–0.43)
IRON: 34 UG/DL (ref 35–150)
LYMPHOCYTES #: 0.8 10^3/UL (ref 0.8–2.9)
LYMPHOCYTES %: 12.2 % (ref 15–51)
MEAN CORPUSCULAR HEMOGLOBIN: 30 PG (ref 29–33)
MEAN CORPUSCULAR HGB CONC: 32.7 G/DL (ref 32–37)
MEAN CORPUSCULAR VOLUME: 92 FL (ref 82–101)
MEAN PLATELET VOLUME: 9.6 FL (ref 7.4–10.4)
MONOCYTE #: 0.8 10^3/UL (ref 0.3–0.9)
MONOCYTES %: 12.6 % (ref 0–11)
NEUTROPHIL #: 4.5 10^3/UL (ref 1.6–7.5)
NEUTROPHILS %: 68.2 % (ref 39–77)
NUCLEATED RED BLOOD CELLS #: 0 10^3/UL (ref 0–0)
NUCLEATED RED BLOOD CELLS%: 0 /100WBC (ref 0–0)
PHOSPHORUS: 4.8 MG/DL (ref 2.5–4.9)
PLATELET COUNT: 218 10^3/UL (ref 140–415)
POTASSIUM: 4.6 MMOL/L (ref 3.5–5.1)
RED BLOOD COUNT: 3.23 10^6/UL (ref 4.7–6.1)
RED CELL DISTRIBUTION WIDTH: 14.3 % (ref 11.5–14.5)
SODIUM: 140 MMOL/L (ref 135–144)
TOTAL IRON BINDING CAPACITY: 172 UG/DL (ref 241–421)
URIC ACID: 4.9 MG/DL (ref 3.1–7.9)
VANCOMYCIN,RANDOM: 17.3 UG/ML
WHITE BLOOD COUNT: 6.7 10^3/UL (ref 4.8–10.8)

## 2019-08-03 PROCEDURE — 0Y9M0ZX DRAINAGE OF RIGHT FOOT, OPEN APPROACH, DIAGNOSTIC: ICD-10-PCS | Performed by: PODIATRIST

## 2019-08-03 PROCEDURE — 5A1D70Z PERFORMANCE OF URINARY FILTRATION, INTERMITTENT, LESS THAN 6 HOURS PER DAY: ICD-10-PCS

## 2019-08-03 PROCEDURE — 0JBQ0ZZ EXCISION OF RIGHT FOOT SUBCUTANEOUS TISSUE AND FASCIA, OPEN APPROACH: ICD-10-PCS | Performed by: PODIATRIST

## 2019-08-03 PROCEDURE — 0JBQ0ZZ EXCISION OF RIGHT FOOT SUBCUTANEOUS TISSUE AND FASCIA, OPEN APPROACH: ICD-10-PCS

## 2019-08-03 PROCEDURE — 0Y9M0ZX DRAINAGE OF RIGHT FOOT, OPEN APPROACH, DIAGNOSTIC: ICD-10-PCS

## 2019-08-03 RX ADMIN — INSULIN ASPART 1 UNIT: 100 INJECTION, SOLUTION INTRAVENOUS; SUBCUTANEOUS at 17:36

## 2019-08-03 RX ADMIN — COLLAGENASE SANTYL SCH APPLIC: 250 OINTMENT TOPICAL at 09:00

## 2019-08-03 RX ADMIN — INSULIN GLARGINE 1 UNITS: 100 INJECTION, SOLUTION SUBCUTANEOUS at 20:48

## 2019-08-03 RX ADMIN — LINAGLIPTIN SCH MG: 5 TABLET, FILM COATED ORAL at 12:53

## 2019-08-03 RX ADMIN — CEFTRIAXONE 1 MLS/HR: 1 INJECTION, SOLUTION INTRAVENOUS at 02:00

## 2019-08-03 RX ADMIN — FUROSEMIDE 1 MG: 40 TABLET ORAL at 12:56

## 2019-08-03 RX ADMIN — CEFTRIAXONE SCH MLS/HR: 1 INJECTION, SOLUTION INTRAVENOUS at 02:00

## 2019-08-03 RX ADMIN — ATORVASTATIN CALCIUM SCH MG: 20 TABLET, FILM COATED ORAL at 20:44

## 2019-08-03 RX ADMIN — TIMOLOL MALEATE SCH DROP: 5 SOLUTION/ DROPS OPHTHALMIC at 20:44

## 2019-08-03 RX ADMIN — INSULIN ASPART 1 UNIT: 100 INJECTION, SOLUTION INTRAVENOUS; SUBCUTANEOUS at 06:27

## 2019-08-03 RX ADMIN — DOCUSATE SODIUM 1 MG: 100 CAPSULE, LIQUID FILLED ORAL at 12:55

## 2019-08-03 RX ADMIN — INSULIN ASPART 1 UNIT: 100 INJECTION, SOLUTION INTRAVENOUS; SUBCUTANEOUS at 02:13

## 2019-08-03 RX ADMIN — BRIMONIDINE TARTRATE SCH DROP: 2 SOLUTION OPHTHALMIC at 20:43

## 2019-08-03 RX ADMIN — INSULIN ASPART 1 UNIT: 100 INJECTION, SOLUTION INTRAVENOUS; SUBCUTANEOUS at 20:47

## 2019-08-03 RX ADMIN — INSULIN GLARGINE SCH UNITS: 100 INJECTION, SOLUTION SUBCUTANEOUS at 20:48

## 2019-08-03 RX ADMIN — MINOXIDIL 1 MG: 10 TABLET ORAL at 12:53

## 2019-08-03 RX ADMIN — INSULIN ASPART 1 UNIT: 100 INJECTION, SOLUTION INTRAVENOUS; SUBCUTANEOUS at 12:50

## 2019-08-03 RX ADMIN — POVIDONE-IODINE 1 APPLIC: 100 OINTMENT TOPICAL at 09:00

## 2019-08-03 RX ADMIN — TIMOLOL MALEATE 1 DROP: 5 SOLUTION/ DROPS OPHTHALMIC at 12:53

## 2019-08-03 RX ADMIN — ENOXAPARIN SODIUM 1 MG: 100 INJECTION SUBCUTANEOUS at 09:00

## 2019-08-03 RX ADMIN — LINAGLIPTIN 1 MG: 5 TABLET, FILM COATED ORAL at 12:53

## 2019-08-03 RX ADMIN — POVIDONE-IODINE SCH APPLIC: 100 OINTMENT TOPICAL at 09:00

## 2019-08-03 RX ADMIN — FUROSEMIDE SCH MG: 40 TABLET ORAL at 12:56

## 2019-08-03 RX ADMIN — Medication 1 TAB: at 12:52

## 2019-08-03 RX ADMIN — MORPHINE SULFATE 1 MG: 2 INJECTION, SOLUTION INTRAMUSCULAR; INTRAVENOUS at 17:55

## 2019-08-03 RX ADMIN — INSULIN ASPART 1 UNIT: 100 INJECTION, SOLUTION INTRAVENOUS; SUBCUTANEOUS at 17:35

## 2019-08-03 RX ADMIN — MINOXIDIL SCH MG: 10 TABLET ORAL at 12:53

## 2019-08-03 RX ADMIN — DOCUSATE SODIUM SCH MG: 100 CAPSULE, LIQUID FILLED ORAL at 12:55

## 2019-08-03 RX ADMIN — INSULIN ASPART 1 UNIT: 100 INJECTION, SOLUTION INTRAVENOUS; SUBCUTANEOUS at 12:48

## 2019-08-03 RX ADMIN — HEPARIN SODIUM 1 UNIT: 5000 INJECTION, SOLUTION INTRAVENOUS; SUBCUTANEOUS at 20:47

## 2019-08-03 RX ADMIN — AMLODIPINE BESYLATE 1 MG: 5 TABLET ORAL at 12:56

## 2019-08-03 RX ADMIN — HEPARIN SODIUM SCH UNIT: 5000 INJECTION, SOLUTION INTRAVENOUS; SUBCUTANEOUS at 20:47

## 2019-08-03 RX ADMIN — COLLAGENASE SANTYL 1 APPLIC: 250 OINTMENT TOPICAL at 09:00

## 2019-08-03 RX ADMIN — BACITRACIN 1 ML: 50000 INJECTION, POWDER, FOR SOLUTION INTRAMUSCULAR at 07:55

## 2019-08-03 RX ADMIN — INSULIN ASPART 1 UNIT: 100 INJECTION, SOLUTION INTRAVENOUS; SUBCUTANEOUS at 07:35

## 2019-08-03 RX ADMIN — VANCOMYCIN HYDROCHLORIDE 1 MLS/HR: 1 INJECTION, POWDER, LYOPHILIZED, FOR SOLUTION INTRAVENOUS at 17:59

## 2019-08-03 RX ADMIN — PANTOPRAZOLE SODIUM 1 MG: 40 TABLET, DELAYED RELEASE ORAL at 06:00

## 2019-08-03 RX ADMIN — ATORVASTATIN CALCIUM 1 MG: 20 TABLET, FILM COATED ORAL at 20:44

## 2019-08-03 RX ADMIN — TIMOLOL MALEATE 1 DROP: 5 SOLUTION/ DROPS OPHTHALMIC at 20:44

## 2019-08-03 RX ADMIN — BRIMONIDINE TARTRATE 1 DROP: 2 SOLUTION OPHTHALMIC at 20:43

## 2019-08-03 RX ADMIN — PANTOPRAZOLE SODIUM SCH MG: 40 TABLET, DELAYED RELEASE ORAL at 06:00

## 2019-08-03 RX ADMIN — TIMOLOL MALEATE SCH DROP: 5 SOLUTION/ DROPS OPHTHALMIC at 12:53

## 2019-08-03 RX ADMIN — AMLODIPINE BESYLATE SCH MG: 5 TABLET ORAL at 12:56

## 2019-08-03 NOTE — CONS
Assessment/Plan


Assessment/Plan


Hospital Course (Demo Recall)


ID PROGRESS NOTE


CURRENT ABX: DAY #  =>   VANCO IV + CEFTRIAXONE 


    8/3/19 0613                                                                 


              8/3/19 0612


24H INTERVAL SUMMARY


* Resting with eyes closed, low grade Tmax 99.+, VSS, NAD, Chart reviewed 


DIAGNOSTIC IMAGING 


* 08/03/19 FOOT MRI:


   *  1.  Dorsal soft tissue swelling with a focus of soft tissue inflammation 


     dorsal to the proximal aspect of the first metatarsal and a possible small 


     elongated fluid collection measuring 1 cm in its longest dimension. 


     Findings could be focal cellulitis, phlegmon or small abscess.


   * 2.  No evidence for osteomyelitis.


   * 3.  No evidence for soft tissue air to suggest gangrene.


MICRO


* 08/01/19  WOUND CULTURE  Preliminary  


     Organism 1                     STAPHYLOCOCCUS AUREUS


        QUANTITY                    SCANT GROWTH


* 08/01/19 BCx (-) 





PHYSICAL EXAMINATION:


GENERAL: VSS, NAD


HEENT: AT, NC, 


NECK:  Supple, 


CHEST: Rise symmetrical 


HEART:  Pulse RRR


ABDOMEN:  


EXTREMITIES:  Warm, dry  == DSG C/D/I 


SKIN: No rash, no diaphoresis





ID ASSESSMENT


60 yo M admit with: 


1.  Right foot abscess.


2.  Right foot cellulitis.


3.  Gangrene.


4.  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


5.  History of partial right third toe amputation.


 (?) MRSA Nares


ABX ALLERGIES: KNDA


INVASIVES:  


CURRENT ABX: DAY #  => VANCO IV + CEFTRIAXONE





ID RECOMMENDATIONS/PLAN:  


1. Continue current ABX -- await final ID of Staph wound cx pending 


.





Consultation Date/Type/Reason


Admit Date/Time


Aug 1, 2019 at 19:35


Initial Consult Date





Date/Time of Note


DATE: 8/3/19 


TIME: 17:09





Exam/Review of Systems


Exam


Vitals





Vital Signs


  Date      Temp  Pulse  Resp  B/P (MAP)   Pulse Ox  O2          O2 Flow    FiO2


Time                                                 Delivery    Rate


    8/3/19  99.2     97    19      120/60        95  Room Air


     14:00                           (80)


    8/3/19                                                             2.0


     08:58








Intake and Output





8/2/19


8/2/19


8/3/19





1515:00


23:00


07:00





IntakeIntake Total


550 ml


250 ml


50 ml





OutputOutput Total


200 ml


3700 ml





BalanceBalance


550 ml


50 ml


-3650 ml














Results


Result Diagram:  


8/3/19 0613                                                                     


          8/3/19 0612





Results 24hrs





Laboratory Tests


Test
                     8/2/19
17:32  8/2/19
20:26  8/3/19
01:54  8/3/19
06:02


Bedside Glucose                 230  H        264  H         215


Hemoglobin A1c                                                            6.7  H


Test
                     8/3/19
06:12  8/3/19
06:13  8/3/19
06:24  8/3/19
10:09


Sodium Level                     140


Potassium Level                  4.6


Chloride Level                   100


Carbon Dioxide Level              26


Anion Gap                        14  H


Blood Urea Nitrogen             44  #H


Creatinine                    5.77  #H


Est Glomerular Filtrat          10  L
  
             
             



Rate
mL/min


Glucose Level                    204


Uric Acid                        4.9


Calcium Level                    9.0


Phosphorus Level                 4.8


Iron Level                       34  L


Total Iron Binding              172  L


Capacity


Percent Iron Saturation          20  L


Random Vancomycin Level         17.3


White Blood Count                              6.7


Red Blood Count                              3.23  L


Hemoglobin                                    9.7  L


Hematocrit                                   29.7  L


Mean Corpuscular Volume                       92.0


Mean Corpuscular                              30.0


Hemoglobin


Mean Corpuscular          
                  32.7  
  
             



Hemoglobin
Concent


Red Cell Distribution                         14.3


Width


Platelet Count                                 218


Mean Platelet Volume                           9.6


Immature Granulocytes %                     2.900  H


Neutrophils %                                 68.2


Lymphocytes %                                12.2  L


Monocytes %                                  12.6  H


Eosinophils %                                  3.8


Basophils %                                    0.3


Nucleated Red Blood                            0.0


Cells %


Immature Granulocytes #                     0.190  H


Neutrophils #                                  4.5


Lymphocytes #                                  0.8


Monocytes #                                    0.8


Eosinophils #                                  0.3


Basophils #                                    0.0


Nucleated Red Blood                            0.0


Cells #


Vitamin D 1,25-Dihydroxy                      50.5


Bedside Glucose                                              195           186


Test
                     8/3/19
12:43  
             
             



Bedside Glucose                 233  H








Medications


Medication





Current Medications


Diagnostic Test (Pha) (Accu-Chek) 1 ea 02 XX ;  Start 8/2/19 at 02:00;  Status 


Hold


Ceftriaxone Sodium 50 ml @  100 mls/hr Q24H IVPB  Last administered on 8/3/19at 


02:00; Admin Dose 100 MLS/HR;  Start 8/2/19 at 00:30


Acetaminophen (Tylenol Tab) 650 mg Q6H  PRN PO MILD PAIN(1-3)OR ELEVATED TEMP;  


Start 8/2/19 at 00:30


Zolpidem Tartrate (Ambien) 5 mg HS  PRN PO INSOMNIA Last administered on 


8/2/19at 01:17; Admin Dose 5 MG;  Start 8/2/19 at 00:30


Timolol Maleate (Timoptic 0.5%) 1 drop BID BOTH EYES  Last administered on 8


/3/19at 12:53; Admin Dose 1 DROP;  Start 8/2/19 at 09:00


Amlodipine Besylate (Norvasc) 5 mg DAILY PO  Last administered on 8/3/19at 


12:56; Admin Dose 5 MG;  Start 8/2/19 at 09:00


Atorvastatin Calcium (Lipitor) 20 mg QHS PO  Last administered on 8/2/19at 


20:28; Admin Dose 20 MG;  Start 8/2/19 at 21:00


Docusate Sodium (Colace) 200 mg DAILY PO  Last administered on 8/3/19at 12:55; 


Admin Dose 200 MG;  Start 8/2/19 at 09:00


Furosemide (Lasix) 40 mg DAILY PO  Last administered on 8/3/19at 12:56; Admin 


Dose 40 MG;  Start 8/2/19 at 09:00


Minoxidil (Loniten) 10 mg DAILY PO  Last administered on 8/3/19at 12:53; Admin 


Dose 10 MG;  Start 8/2/19 at 09:00


Multivit/Ca Carb/ B Cmplx/FA/Prenat (Yen-Barbara) 1 tab DAILY PO  Last 


administered on 8/3/19at 12:52; Admin Dose 1 TAB;  Start 8/2/19 at 09:00


Brimonidine Tartrate (Alphagan 0.2%) 1 drop BID BOTH EYES ;  Start 8/3/19 at 


21:00


Collagenase (Santyl) 1 applic DAILY TOP  Last administered on 8/2/19at 17:30; 


Admin Dose 1 APPLIC;  Start 8/2/19 at 11:30


Miscellaneous Information 1 ea NOTE XX ;  Start 8/2/19 at 11:30


Glucose (Glutose) 15 gm Q15M  PRN PO DECREASED GLUCOSE;  Start 8/2/19 at 11:30


Glucose (Glutose) 22.5 gm Q15M  PRN PO DECREASED GLUCOSE;  Start 8/2/19 at 11:30


Dextrose (D50w Syringe) 25 ml Q15M  PRN IV DECREASED GLUCOSE;  Start 8/2/19 at 


11:30


Dextrose (D50w Syringe) 50 ml Q15M  PRN IV DECREASED GLUCOSE;  Start 8/2/19 at 


11:30


Glucagon (Glucagen) 1 mg Q15M  PRN IM DECREASED GLUCOSE;  Start 8/2/19 at 11:30


Glucose (Glutose) 15 gm Q15M  PRN BUCCAL DECREASED GLUCOSE;  Start 8/2/19 at 


11:30


Pantoprazole (Protonix Tab) 40 mg DAILY@06 PO  Last administered on 8/2/19at 


16:34; Admin Dose 40 MG;  Start 8/2/19 at 16:00


Linagliptin (Tradjenta) 5 mg DAILY PO  Last administered on 8/3/19at 12:53; 


Admin Dose 5 MG;  Start 8/2/19 at 15:00


Insulin Aspart (Novolog Insulin Pen) 5 unit WITH  MEALS SC  Last administered on


8/3/19at 12:48; Admin Dose 5 UNIT;  Start 8/2/19 at 17:35


Vancomycin HCl (Vanco Iv Per Pharmacy) VANCOMYCIN PER PHARMACY PER PROTOCOL XX ;


 Start 8/2/19 at 15:30


Povidone Iodine (Povidone-Iodine) 1 applic DAILY TOP ;  Start 8/3/19 at 09:00


Prochlorperazine (Compazine Inj) 5 mg PACU ORDER PRN IV NAUSEA/VOMITING;  Start 


8/3/19 at 08:00


Vancomycin HCl 250 ml @  125 mls/hr ONCE IVPB ;  Start 8/3/19 at 18:00;  Stop 


8/3/19 at 23:59


Ferric Sodium Gluconate Complex 125 mg/Sodium Chloride 110 ml @  110 mls/hr 


DAILY@1300 IVPB ;  Start 8/4/19 at 13:00;  Stop 8/8/19 at 13:59


Epoetin Rfai-epbx (Retacrit (Esrd)) 4,000 unit MoWeFr@1700 SC ;  Start 8/5/19 at


17:00


Morphine Sulfate (morphine) 2 mg Q4H  PRN IV SEVERE PAIN LEVEL 7-10;  Start 


8/3/19 at 15:00


Acetaminophen/ Hydrocodone Bitart (Norco (5/325)) 1 tab Q6H  PRN PO MODERATE 


PAIN LEVEL 4-6;  Start 8/3/19 at 15:00


Insulin Glargine (Lantus) 20 units BID SC ;  Start 8/3/19 at 21:00


Heparin Sodium (Porcine) (Heparin  (5000 Units/1ml)) 5,000 unit BID SC ;  Start 


8/3/19 at 21:00


Insulin Aspart (Novolog Insulin Pen) (Adult SC Insulin - Moder... WITH MEALS  


BEDTIME SC ;  Start 8/3/19 at 18:05











IRAIDA GRANDA NP               Aug 3, 2019 17:10

## 2019-08-03 NOTE — HPN
Date/Time of Note


Date/Time of Note


DATE: 8/3/19 


TIME: 07:25





Interval H&P Admission Note


Pt. seen H&P reviewed:  No system changes











DUNCAN CAMPO DPM           Aug 3, 2019 07:25

## 2019-08-03 NOTE — PREAC
Date/Time of Note


Date/Time of Note


DATE: 8/3/19 


TIME: 07:24





Anesthesia Eval and Record


Evaluation


Time Pre-Procedure Interview


DATE: 8/3/19 


TIME: 07:24


Age


59


Sex


male


NPO:  8 hrs


Preoperative diagnosis


right foot cellulitis, necrosis


Planned procedure


right foot incision and drainage





Past Medical History


Past Medical History:  Includes


Cardio:  HTN, Dyslipidemia


Endo:  Diabetes


Renal:  ESRD on dialysis


Heme:  Anemia





Surgery & Anesthesia Issues


No known issue





Meds


Anticoagulation:  No


Beta Blocker within 24 hr:  No


Reason Beta Blocker not given:  Pt. not on B-Blocker


Reported Medications


Multivit/Ca Carb/B Cmplx/Fa* (Yen-Barbara*) 1 Tab Tab, 1 TAB PO DAILY, TAB


   8/1/19


Furosemide* (Furosemide*) 40 Mg Tablet, 40 MG PO DAILY, TAB


   8/1/19


Atorvastatin Calcium* (Atorvastatin Calcium*) 20 Mg Tablet, 20 MG PO QHS, #30 


TAB


   8/1/19


Amlodipine Besylate* (Norvasc*) 5 Mg Tablet, 5 MG PO DAILY, TAB


   8/1/19


Minoxidil* (Lonitin*) 10 Mg Tab, 10 MG PO DAILY, TAB


   8/1/19


Docusate Sodium* (Colace*) 100 Mg Capsule, 200 MG PO DAILY, #30 CAP


   8/1/19


Omeprazole* (Omeprazole*) 20 Mg Capsule.dr, 20 MG PO DAILY, #30 CAP


   8/1/19


Sitagliptin* (Januvia*) 50 Mg Tablet, 50 MG PO DAILY, #30 TAB


   8/1/19


Insulin Glargine* (Lantus*) 100 Unit/Ml Soln, 40 UNIT SC BID, #1 VIAL


   8/1/19


Insulin Aspart* (Novolog Insulin Pen*) 100 Unit/Ml Soln, 0 SC .SLIDING SCALE AC,


EA


   8/1/19


Calcifediol (Rayaldee) 30 Mcg Cap.sa.24h, 30 MCG PO DAILY


   8/1/19


Levofloxacin* (Levaquin*) 500 Mg Tablet, 500 MG PO DAILY, TAB


   FOR 10 DAYS, START DATE 7/29/19 8/1/19


Discontinued Reported Medications


Minoxidil* (Lonitin*) 2.5 Mg Tab, 5 MG PO QPM, TAB


   5/16/15


Docusate Sodium* (Docusate Sodium*) 100 Mg Capsule, 100 MG PO QAM, CAP


   5/16/15


Fenofibrate, Micronized (Fenofibrate) 134 Mg Capsule, 134 MG PO DAILY, CAP


   5/16/15


Omeprazole* (Omeprazole*) 20 Mg Capsule.dr, 20 MG PO DAILY, CAP


   5/16/15


Metformin* (Glucophage*) 1,000 Mg Tablet, 1000 MG PO BID, TAB


   5/16/15


Discontinued Scripts


Insulin Glargine* (Lantus*) 100 Unit/Ml Soln, 20 UNIT SC QHS for 28 Days, VIAL


   Prov:CARL BROWN MD         6/4/15


Losartan Potassium* (Cozaar*) 25 Mg Tab, 25 MG GTB DAILY for 28 Days, BOTTLE


   Prov:CARL BROWN MD         6/4/15


Metoprolol Tartrate* (Lopressor*) 25 Mg Tab, 50 MG PO BID for 28 Days, TAB


   Prov:CARL BROWN MD         6/4/15


Insulin Aspart* (Novolog Insulin Pen*) 100 Unit/Ml Soln, 8 UNIT SC WITH MEALS 


for 28 Days, VIAL


   Prov:CARL BROWN MD         6/4/15


Atorvastatin Calcium* (Atorvastatin Calcium*) 20 Mg Tablet, 20 MG PO HS, #60 TAB


   Prov:JIM GRAJEDA MD         5/18/15





Current Medications


Diagnostic Test (Pha) (Accu-Chek) 1 ea 02 XX ;  Start 8/2/19 at 02:00;  Status 


Hold


Insulin Aspart (Novolog Insulin Pen) NOVOLOG *MODERATE* ALGORITHM WITH MEALS  


BEDTIME SC  Last administered on 8/2/19at 20:31; Admin Dose 3 UNIT;  Start 


8/2/19 at 08:00;  Status Hold


Ceftriaxone Sodium 50 ml @  100 mls/hr Q24H IVPB  Last administered on 8/3/19at 


02:00; Admin Dose 100 MLS/HR;  Start 8/2/19 at 00:30


Acetaminophen (Tylenol Tab) 650 mg Q6H  PRN PO MILD PAIN(1-3)OR ELEVATED TEMP;  


Start 8/2/19 at 00:30


Zolpidem Tartrate (Ambien) 5 mg HS  PRN PO INSOMNIA Last administered on 


8/2/19at 01:17; Admin Dose 5 MG;  Start 8/2/19 at 00:30


Timolol Maleate (Timoptic 0.5%) 1 drop BID BOTH EYES  Last administered on 


8/2/19at 20:28; Admin Dose 1 DROP;  Start 8/2/19 at 09:00


Amlodipine Besylate (Norvasc) 5 mg DAILY PO  Last administered on 8/2/19at 


08:49; Admin Dose 5 MG;  Start 8/2/19 at 09:00


Atorvastatin Calcium (Lipitor) 20 mg QHS PO  Last administered on 8/2/19at 


20:28; Admin Dose 20 MG;  Start 8/2/19 at 21:00


Docusate Sodium (Colace) 200 mg DAILY PO  Last administered on 8/2/19at 08:50; 


Admin Dose 200 MG;  Start 8/2/19 at 09:00


Furosemide (Lasix) 40 mg DAILY PO  Last administered on 8/2/19at 08:49; Admin 


Dose 40 MG;  Start 8/2/19 at 09:00


Insulin Glargine (Lantus) 40 units BID SC  Last administered on 8/2/19at 20:30; 


Admin Dose 40 UNITS;  Start 8/2/19 at 09:00


Minoxidil (Loniten) 10 mg DAILY PO  Last administered on 8/2/19at 08:48; Admin 


Dose 10 MG;  Start 8/2/19 at 09:00


Multivit/Ca Carb/ B Cmplx/FA/Prenat (Yen-Barbara) 1 tab DAILY PO  Last 


administered on 8/2/19at 08:49; Admin Dose 1 TAB;  Start 8/2/19 at 09:00


Non-Formulary Medication 1 ea BID BOTH EYES ;  Start 8/2/19 at 09:00;  Status 


UNV


Collagenase (Santyl) 1 applic DAILY TOP  Last administered on 8/2/19at 17:30; 


Admin Dose 1 APPLIC;  Start 8/2/19 at 11:30


Miscellaneous Information 1 ea NOTE XX ;  Start 8/2/19 at 11:30


Glucose (Glutose) 15 gm Q15M  PRN PO DECREASED GLUCOSE;  Start 8/2/19 at 11:30


Glucose (Glutose) 22.5 gm Q15M  PRN PO DECREASED GLUCOSE;  Start 8/2/19 at 11:30


Dextrose (D50w Syringe) 25 ml Q15M  PRN IV DECREASED GLUCOSE;  Start 8/2/19 at 


11:30


Dextrose (D50w Syringe) 50 ml Q15M  PRN IV DECREASED GLUCOSE;  Start 8/2/19 at 


11:30


Glucagon (Glucagen) 1 mg Q15M  PRN IM DECREASED GLUCOSE;  Start 8/2/19 at 11:30


Glucose (Glutose) 15 gm Q15M  PRN BUCCAL DECREASED GLUCOSE;  Start 8/2/19 at 


11:30


Pantoprazole (Protonix Tab) 40 mg DAILY@06 PO  Last administered on 8/2/19at 


16:34; Admin Dose 40 MG;  Start 8/2/19 at 16:00


Linagliptin (Tradjenta) 5 mg DAILY PO  Last administered on 8/2/19at 17:34; 


Admin Dose 5 MG;  Start 8/2/19 at 15:00


Insulin Aspart (Novolog Insulin Pen) 5 unit WITH  MEALS SC  Last administered on


8/2/19at 17:38; Admin Dose 5 UNIT;  Start 8/2/19 at 17:35


Enoxaparin Sodium (Lovenox) 30 mg DAILY SC ;  Start 8/3/19 at 09:00


Vancomycin HCl (Vanco Iv Per Pharmacy) VANCOMYCIN PER PHARMACY PER PROTOCOL XX ;


 Start 8/2/19 at 15:30


Povidone Iodine (Povidone-Iodine) 1 applic DAILY TOP ;  Start 8/3/19 at 09:00


Insulin Aspart (Novolog Insulin Pen) NOVOLOG *MODERATE* ALGORI... Q4 SC  Last 


administered on 8/3/19at 06:27; Admin Dose 4 UNIT;  Start 8/3/19 at 05:00


Meds reviewed:  Yes





Allergies


Coded Allergies:  


     No Known Allergies (Verified  Allergy, Mild, 8/1/19)


Allergies Reviewed:  Yes





Labs/Studies


Labs Reviewed:  Reviewed by anesthesiologist


Result Diagram:  


8/3/19 0613                                                                     


          8/3/19 0612





Laboratory Tests


8/3/19 06:12








8/3/19 06:13








Pregnancy test:  N/A





Pre-procedure Exam


Last vitals





Vital Signs


  Date      Temp  Pulse  Resp  B/P (MAP)   Pulse Ox  O2          O2 Flow    FiO2


Time                                                 Delivery    Rate


    8/3/19           97


     01:00


    8/2/19                 17      125/58            Room Air


     22:57                           (80)


    8/2/19  98.5                                 94


     20:30





Airway:  Adequate mouth opening, Adequate thyromental dist


Mallampati:  Mallampati II


Teeth:  Normal


Lung:  Normal


Heart:  Normal





ASA Physical Status


ASA physical status:  3


Emergency:  None





Planned Anesthetic


General/MAC:  Mask





Planned Pain Management


Parenteral pain med, Local by surgeon





Pre-operative Attestations


Prior to commencing anesthesia and surgery, the patient was re-evaluated, there 


was verification of:


*The patient's identity


*The results of appropriate recent lab work and preoperative vital signs


*The above evaluation not changing prior to induction


*Anesthetic plan, risk benefits, alternative and complications discussed with 


patient/family; questions answered; patient/family understands, accepts and 


wishes to proceed.











MISA HAJI MD                 Aug 3, 2019 07:26

## 2019-08-03 NOTE — PN
Date/Time of Note


Date/Time of Note


DATE: 8/3/19 


TIME: 14:41





Assessment/Plan


VTE Prophylaxis


Risk score (from Ns)>0 risk:  6


SCD applied (from [facility] [place]):  No


SCD contraindicated:  other


Pharmacological prophylaxis:  LMWH





Lines/Catheters


IV Catheter Type (from [facility]):  Saline Lock





Assessment/Plan


Hospital Course


1. right foot cellulitis with necrotic wound, pt has redness and edema only for 


5 days. pt has PAD. s/p Right foot incision and drainage 8/3/2019


2. Hypertension, controlled


3. Obesity,  


4. Diabetes mellitus type II , BS is uncontrolled


5. ESRD on HD services, MWF


6. Anemia chronic disease, also iron deficiency


7. Hyperkalemia 2/2 ESRD, resolved after HD


8. S/p abd. laparotomy,hx of diverticulosis


9.  s/p left foot 3 toe amputation,


10.  s.p bilateral lenses replacement,


11.  s/p left arm AV fistula creation


12. UTI


13. Non compliance, pt refusing lovenox


Assessment/Plan


- us ARTERIAL: Monophasic waveforms in the right dorsalis pedis. No focal area 


of high degree stenosis.


-PT IS REF. LOVENOX, spoke to him, explain


-c/w VAnco and Rocephin


-s/p right  wound procedure 


-c/w HD


-hypoglycemic control


-start Epogen


Result Diagram:  


8/3/19 0613                                                                     


          8/3/19 0612





Results 24hrs





Laboratory Tests


Test
                     8/2/19
17:32  8/2/19
20:26  8/3/19
01:54  8/3/19
06:02


Bedside Glucose                 230  H        264  H         215


Hemoglobin A1c                                                            6.7  H


Test
                     8/3/19
06:12  8/3/19
06:13  8/3/19
06:24  8/3/19
10:09


Sodium Level                     140


Potassium Level                  4.6


Chloride Level                   100


Carbon Dioxide Level              26


Anion Gap                        14  H


Blood Urea Nitrogen             44  #H


Creatinine                    5.77  #H


Est Glomerular Filtrat          10  L
  
             
             



Rate
mL/min


Glucose Level                    204


Uric Acid                        4.9


Calcium Level                    9.0


Phosphorus Level                 4.8


Iron Level                       34  L


Total Iron Binding              172  L


Capacity


Percent Iron Saturation          20  L


Random Vancomycin Level         17.3


White Blood Count                              6.7


Red Blood Count                              3.23  L


Hemoglobin                                    9.7  L


Hematocrit                                   29.7  L


Mean Corpuscular Volume                       92.0


Mean Corpuscular                              30.0


Hemoglobin


Mean Corpuscular          
                  32.7  
  
             



Hemoglobin
Concent


Red Cell Distribution                         14.3


Width


Platelet Count                                 218


Mean Platelet Volume                           9.6


Immature Granulocytes %                     2.900  H


Neutrophils %                                 68.2


Lymphocytes %                                12.2  L


Monocytes %                                  12.6  H


Eosinophils %                                  3.8


Basophils %                                    0.3


Nucleated Red Blood                            0.0


Cells %


Immature Granulocytes #                     0.190  H


Neutrophils #                                  4.5


Lymphocytes #                                  0.8


Monocytes #                                    0.8


Eosinophils #                                  0.3


Basophils #                                    0.0


Nucleated Red Blood                            0.0


Cells #


Vitamin D 1,25-Dihydroxy                      50.5


Bedside Glucose                                              195           186


Test
                     8/3/19
12:43  
             
             



Bedside Glucose                 233  H








Subjective


24 Hr Interval Summary


Eyes:  no complaints


ENT:  no complaints


Respiratory:  no complaints


Genitourinary:  no complaints


Musculoskeletal:  bone/joint pain, restricted range of motion, swelling (right 


foot)


Endocrine:  no complaints





Exam/Review of Systems


Exam


Vitals





Vital Signs


  Date      Temp  Pulse  Resp  B/P (MAP)   Pulse Ox  O2          O2 Flow    FiO2


Time                                                 Delivery    Rate


    8/3/19  99.2     97    19      120/60        95  Room Air


     14:00                           (80)


    8/3/19                                                             2.0


     08:58








Intake and Output





8/2/19


8/2/19


8/3/19





1515:00


23:00


07:00





IntakeIntake Total


550 ml


250 ml


50 ml





OutputOutput Total


200 ml


3700 ml





BalanceBalance


550 ml


50 ml


-3650 ml











Exam


No acute distress, no events overnight.


Eyes: anicteric, EOM's intact, no pallor


Nose: no rhinorrhea


Neck: supple, no thyromegaly, no carotid bruits


Lungs: clear bilaterally, decreased.


CVS: regular rate and rhythm, no murmurs


Abdomen: soft, bowel sounds present, no hepatosplenomegally, no masses, no 


rebound or guarding.


Rectal: differed.


External genitalia: no lesions.


Extremities: no edema, DP pulses are palpable


Neuro: alert and oriented x 3


Gait: not checked


Motor strength: 5+/5+


Sensory exam: normal





Skin: right foot with surgical dressing





Results


Results 24hrs





Laboratory Tests


Test
                     8/2/19
17:32  8/2/19
20:26  8/3/19
01:54  8/3/19
06:02


Bedside Glucose                 230  H        264  H         215


Hemoglobin A1c                                                            6.7  H


Test
                     8/3/19
06:12  8/3/19
06:13  8/3/19
06:24  8/3/19
10:09


Sodium Level                     140


Potassium Level                  4.6


Chloride Level                   100


Carbon Dioxide Level              26


Anion Gap                        14  H


Blood Urea Nitrogen             44  #H


Creatinine                    5.77  #H


Est Glomerular Filtrat          10  L
  
             
             



Rate
mL/min


Glucose Level                    204


Uric Acid                        4.9


Calcium Level                    9.0


Phosphorus Level                 4.8


Iron Level                       34  L


Total Iron Binding              172  L


Capacity


Percent Iron Saturation          20  L


Random Vancomycin Level         17.3


White Blood Count                              6.7


Red Blood Count                              3.23  L


Hemoglobin                                    9.7  L


Hematocrit                                   29.7  L


Mean Corpuscular Volume                       92.0


Mean Corpuscular                              30.0


Hemoglobin


Mean Corpuscular          
                  32.7  
  
             



Hemoglobin
Concent


Red Cell Distribution                         14.3


Width


Platelet Count                                 218


Mean Platelet Volume                           9.6


Immature Granulocytes %                     2.900  H


Neutrophils %                                 68.2


Lymphocytes %                                12.2  L


Monocytes %                                  12.6  H


Eosinophils %                                  3.8


Basophils %                                    0.3


Nucleated Red Blood                            0.0


Cells %


Immature Granulocytes #                     0.190  H


Neutrophils #                                  4.5


Lymphocytes #                                  0.8


Monocytes #                                    0.8


Eosinophils #                                  0.3


Basophils #                                    0.0


Nucleated Red Blood                            0.0


Cells #


Vitamin D 1,25-Dihydroxy                      50.5


Bedside Glucose                                              195           186


Test
                     8/3/19
12:43  
             
             



Bedside Glucose                 233  H








Medications


Medication





Current Medications


Diagnostic Test (Pha) (Accu-Chek) 1 ea 02 XX ;  Start 8/2/19 at 02:00;  Status 


Hold


Insulin Aspart (Novolog Insulin Pen) NOVOLOG *MODERATE* ALGORITHM WITH MEALS  


BEDTIME SC  Last administered on 8/2/19at 20:31; Admin Dose 3 UNIT;  Start 8/2/1


9 at 08:00;  Status Hold


Ceftriaxone Sodium 50 ml @  100 mls/hr Q24H IVPB  Last administered on 8/3/19at 


02:00; Admin Dose 100 MLS/HR;  Start 8/2/19 at 00:30


Acetaminophen (Tylenol Tab) 650 mg Q6H  PRN PO MILD PAIN(1-3)OR ELEVATED TEMP;  


Start 8/2/19 at 00:30


Zolpidem Tartrate (Ambien) 5 mg HS  PRN PO INSOMNIA Last administered on 


8/2/19at 01:17; Admin Dose 5 MG;  Start 8/2/19 at 00:30


Timolol Maleate (Timoptic 0.5%) 1 drop BID BOTH EYES  Last administered on 


8/3/19at 12:53; Admin Dose 1 DROP;  Start 8/2/19 at 09:00


Amlodipine Besylate (Norvasc) 5 mg DAILY PO  Last administered on 8/3/19at 12:


56; Admin Dose 5 MG;  Start 8/2/19 at 09:00


Atorvastatin Calcium (Lipitor) 20 mg QHS PO  Last administered on 8/2/19at 


20:28; Admin Dose 20 MG;  Start 8/2/19 at 21:00


Docusate Sodium (Colace) 200 mg DAILY PO  Last administered on 8/3/19at 12:55; 


Admin Dose 200 MG;  Start 8/2/19 at 09:00


Furosemide (Lasix) 40 mg DAILY PO  Last administered on 8/3/19at 12:56; Admin 


Dose 40 MG;  Start 8/2/19 at 09:00


Insulin Glargine (Lantus) 40 units BID SC  Last administered on 8/2/19at 20:30; 


Admin Dose 40 UNITS;  Start 8/2/19 at 09:00


Minoxidil (Loniten) 10 mg DAILY PO  Last administered on 8/3/19at 12:53; Admin 


Dose 10 MG;  Start 8/2/19 at 09:00


Multivit/Ca Carb/ B Cmplx/FA/Prenat (Yen-Barbara) 1 tab DAILY PO  Last 


administered on 8/3/19at 12:52; Admin Dose 1 TAB;  Start 8/2/19 at 09:00


Non-Formulary Medication 1 ea BID BOTH EYES ;  Start 8/2/19 at 09:00;  Status 


UNV


Collagenase (Santyl) 1 applic DAILY TOP  Last administered on 8/2/19at 17:30; 


Admin Dose 1 APPLIC;  Start 8/2/19 at 11:30


Miscellaneous Information 1 ea NOTE XX ;  Start 8/2/19 at 11:30


Glucose (Glutose) 15 gm Q15M  PRN PO DECREASED GLUCOSE;  Start 8/2/19 at 11:30


Glucose (Glutose) 22.5 gm Q15M  PRN PO DECREASED GLUCOSE;  Start 8/2/19 at 11:30


Dextrose (D50w Syringe) 25 ml Q15M  PRN IV DECREASED GLUCOSE;  Start 8/2/19 at 


11:30


Dextrose (D50w Syringe) 50 ml Q15M  PRN IV DECREASED GLUCOSE;  Start 8/2/19 at 


11:30


Glucagon (Glucagen) 1 mg Q15M  PRN IM DECREASED GLUCOSE;  Start 8/2/19 at 11:30


Glucose (Glutose) 15 gm Q15M  PRN BUCCAL DECREASED GLUCOSE;  Start 8/2/19 at 


11:30


Pantoprazole (Protonix Tab) 40 mg DAILY@06 PO  Last administered on 8/2/19at 


16:34; Admin Dose 40 MG;  Start 8/2/19 at 16:00


Linagliptin (Tradjenta) 5 mg DAILY PO  Last administered on 8/3/19at 12:53; 


Admin Dose 5 MG;  Start 8/2/19 at 15:00


Insulin Aspart (Novolog Insulin Pen) 5 unit WITH  MEALS SC  Last administered on


8/3/19at 12:48; Admin Dose 5 UNIT;  Start 8/2/19 at 17:35


Enoxaparin Sodium (Lovenox) 30 mg DAILY SC ;  Start 8/3/19 at 09:00


Vancomycin HCl (Vanco Iv Per Pharmacy) VANCOMYCIN PER PHARMACY PER PROTOCOL XX ;


 Start 8/2/19 at 15:30


Povidone Iodine (Povidone-Iodine) 1 applic DAILY TOP ;  Start 8/3/19 at 09:00


Hydromorphone HCl (Dilaudid) 0.2 mg PACU PRN IV MILD PAIN 1-3;  Start 8/3/19 at 


08:00;  Stop 8/3/19 at 16:00


Hydromorphone HCl (Dilaudid) 0.4 mg PACU PRN IV MOD PAIN 4-6;  Start 8/3/19 at 


08:00;  Stop 8/3/19 at 16:00


Hydromorphone HCl (Dilaudid) 0.6 mg PACU PRN IV SEVERE PAIN 7-10;  Start 8/3/19 


at 08:00;  Stop 8/3/19 at 16:00


Fentanyl (Sublimaze) 25 mcg PACU ORDER PRN IV MILD PAIN 1-3;  Start 8/3/19 at 


08:00;  Stop 8/3/19 at 16:00


Ondansetron HCl (Zofran Inj) 4 mg PACU ORDER PRN IV NAUSEA/VOMITING;  Start 


8/3/19 at 08:00;  Stop 8/3/19 at 16:00


Prochlorperazine (Compazine Inj) 5 mg PACU ORDER PRN IV NAUSEA/VOMITING;  Start 


8/3/19 at 08:00


Labetalol HCl (Labetalol) 5 mg PACU ORDER PRN IV HIGH BLOOD PRESSURE;  Start 


8/3/19 at 08:00;  Stop 8/3/19 at 16:00


Hydralazine HCl (Apresoline) 5 mg PACU ORDER PRN IV HIGH BLOOD PRESSURE;  Start 


8/3/19 at 08:00;  Stop 8/3/19 at 16:00


Meperidine HCl (Demerol) 25 mg PACU ORDER PRN IV .RIGORS;  Start 8/3/19 at 


08:00;  Stop 8/3/19 at 16:00


Diphenhydramine HCl (Benadryl) 25 mg PACU ORDER PRN IV .PRURITUS;  Start 8/3/19 


at 08:00;  Stop 8/3/19 at 16:00


Vancomycin HCl 250 ml @  125 mls/hr ONCE IVPB ;  Start 8/3/19 at 18:00;  Stop 


8/3/19 at 23:59


Insulin Aspart (Novolog Insulin Pen) NOVOLOG *MODERATE* ALGORI... AC MEALS AND  


BEDTIME SC ;  Start 8/3/19 at 17:35;  Status TYSON RODRIGUEZ NP              Aug 3, 2019 14:45

## 2019-08-03 NOTE — PAC
Date/Time of Note


Date/Time of Note


DATE: 8/3/19 


TIME: 08:22





Post-Anesthesia Notes


Post-Anesthesia Note


Last documented vital signs





Vital Signs


  Date      Temp  Pulse  Resp  B/P (MAP)   Pulse Ox  O2          O2 Flow    FiO2


Time                                                 Delivery    Rate


    8/3/19           97


     01:00


    8/2/19                 17      125/58            Room Air


     22:57                           (80)


    8/2/19  98.5                                 94


     20:30





Activity:  WNL


Respiratory function:  WNL


Cardiovascular function:  WNL


Mental status:  Baseline


Pain reasonably controlled:  Yes


Hydration appropriate:  Yes


Nausea/Vomiting absent:  Yes


Comments


BP: 140/63


HR: 93


RR: 15


T: 98


SaO2: 96% MISA GILBERT MD                 Aug 3, 2019 08:23

## 2019-08-03 NOTE — OPR
DATE OF OPERATION:  08/03/2019

 

 

SURGEON:  Duncan Plata DPM

 

ASSISTANT:  Tushar Savage DPM

 

PREOPERATIVE DIAGNOSES:

1.  Right foot abscess.

2.  Right foot cellulitis.

3.  Gangrene.

4.  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

5.  History of partial right third toe amputation.

 

POSTOPERATIVE DIAGNOSES:

1.  Right foot abscess.

2.  Right foot cellulitis.

3.  Gangrene.

4.  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

5.  History of partial right third toe amputation.

 

PROCEDURE:  Incision and drainage, right foot below fascia with pulse irrigation, excisional debridem
ent of ulceration and necrotic skin and subcutaneous tissue of less than 20 cm2.

 

PATHOLOGY:  Wound cultures.

 

ANESTHESIA:  General.

 

ESTIMATED BLOOD LOSS:  10 mL.

 

COMPLICATIONS:  None.

 

INDICATION FOR PROCEDURE:  A 59-year-old gentleman with possible insect bite, has developed a patch o
f skin necrosis.  Question for underlying abscess.  The patient with thick intense inflammatory sommers
es to dorsal aspect of the foot, consistent with cellulitis.  The patient is at risk for further tiss
ue necrosis.  Discussed planned procedure.  Informed consent was obtained.  The foot was properly mar
ked, confirmed by the surgical team.  All his questions were answered to his satisfaction.

 

PROCEDURE IN DETAIL:  The patient brought into the operating room and placed in the supine position. 
 Formal timeout performed.  The foot was prepped with Betadine, scrubbed and painted, draped in usual
 sterile fashion.  At this time, the gangrenous patch was debrided excisionally.  There is necrotic s
kin and subcutaneous tissue.  Ulceration measured 1 x 0.5 cm adjacent to the ulceration and incision 
was made approximately 4 to 5 mL of purulence was drained.  There is necrotic subcutaneous tissue, wh
ich was also excised.  The patient had an exposed DP artery which felt calcified.  The artery appeare
d intact.  The ulceration was irrigated with saline with bulb syringe.  At this time, multiple suture
s were applied with long tags to allow for closure.  During subsequent postoperative visits wound was
 left open, patient had EBL of 10 mL.  Hemostasis achieved with pressure.  The dressings included Xer
oform, 4 x 4 gauze, Kerlix and bias.  The patient tolerated the procedure well.

 

 

Dictated By: DUNCAN CURTIS/MOUNIKA

DD:    08/03/2019 08:28:58

DT:    08/03/2019 11:41:19

Conf#: [id number]

DID#:  [id number]

CC: TUSHAR SAVAGE DPM;*EndCC*

## 2019-08-03 NOTE — SIPON
Date/Time of Note


Date/Time of Note


DATE: 8/3/19 


TIME: 08:20





Operative Report


Preoperative Diagnosis


Right foot abscess


Right foot cellulitis


DM2 with PN


h/o right partial 3rd toe amputation


Postoperative Diagnosis


same


Operation/Procedure Performed


Right foot incision and drainage


Surgeon


see signature line


First assist


Elke Oneal DPM


Anesthesia:  general


Estimated blood loss:  0 - 10 ml's


Transfusion Required


   none


Specimen


wound culture


Grafts/Implants


none


Complications


none











DUNCAN CAMPO DPM           Aug 3, 2019 08:23

## 2019-08-04 VITALS — SYSTOLIC BLOOD PRESSURE: 127 MMHG | DIASTOLIC BLOOD PRESSURE: 58 MMHG | HEART RATE: 84 BPM | RESPIRATION RATE: 18 BRPM

## 2019-08-04 VITALS — DIASTOLIC BLOOD PRESSURE: 66 MMHG | RESPIRATION RATE: 18 BRPM | HEART RATE: 85 BPM | SYSTOLIC BLOOD PRESSURE: 145 MMHG

## 2019-08-04 VITALS — HEART RATE: 86 BPM | DIASTOLIC BLOOD PRESSURE: 56 MMHG | RESPIRATION RATE: 18 BRPM | SYSTOLIC BLOOD PRESSURE: 117 MMHG

## 2019-08-04 VITALS — HEART RATE: 84 BPM | RESPIRATION RATE: 19 BRPM | DIASTOLIC BLOOD PRESSURE: 60 MMHG | SYSTOLIC BLOOD PRESSURE: 132 MMHG

## 2019-08-04 LAB
ADD MAN DIFF?: NO
ANION GAP: 14 (ref 5–13)
BASOPHIL #: 0 10^3/UL (ref 0–0.1)
BASOPHILS %: 0.4 % (ref 0–2)
BLOOD UREA NITROGEN: 66 MG/DL (ref 7–20)
CALCIUM: 8.3 MG/DL (ref 8.4–10.2)
CARBON DIOXIDE: 23 MMOL/L (ref 21–31)
CHLORIDE: 99 MMOL/L (ref 97–110)
CREATININE: 7.94 MG/DL (ref 0.61–1.24)
EOSINOPHILS #: 0.3 10^3/UL (ref 0–0.5)
EOSINOPHILS %: 4 % (ref 0–7)
GLUCOSE: 190 MG/DL (ref 70–220)
HEMATOCRIT: 27.5 % (ref 42–52)
HEMOGLOBIN: 9.1 G/DL (ref 14–18)
IMMATURE GRANS #M: 0.21 10^3/UL (ref 0–0.03)
IMMATURE GRANS % (M): 2.6 % (ref 0–0.43)
LYMPHOCYTES #: 1.4 10^3/UL (ref 0.8–2.9)
LYMPHOCYTES %: 17.2 % (ref 15–51)
MEAN CORPUSCULAR HEMOGLOBIN: 30.5 PG (ref 29–33)
MEAN CORPUSCULAR HGB CONC: 33.1 G/DL (ref 32–37)
MEAN CORPUSCULAR VOLUME: 92.3 FL (ref 82–101)
MEAN PLATELET VOLUME: 9.7 FL (ref 7.4–10.4)
MONOCYTE #: 1.2 10^3/UL (ref 0.3–0.9)
MONOCYTES %: 14.6 % (ref 0–11)
NEUTROPHIL #: 5 10^3/UL (ref 1.6–7.5)
NEUTROPHILS %: 61.2 % (ref 39–77)
NUCLEATED RED BLOOD CELLS #: 0 10^3/UL (ref 0–0)
NUCLEATED RED BLOOD CELLS%: 0 /100WBC (ref 0–0)
PLATELET COUNT: 217 10^3/UL (ref 140–415)
POTASSIUM: 4.5 MMOL/L (ref 3.5–5.1)
RED BLOOD COUNT: 2.98 10^6/UL (ref 4.7–6.1)
RED CELL DISTRIBUTION WIDTH: 14.1 % (ref 11.5–14.5)
SODIUM: 136 MMOL/L (ref 135–144)
WHITE BLOOD COUNT: 8.2 10^3/UL (ref 4.8–10.8)

## 2019-08-04 RX ADMIN — CEFAZOLIN 1 MLS/HR: 1 INJECTION, POWDER, FOR SOLUTION INTRAMUSCULAR; INTRAVENOUS at 21:19

## 2019-08-04 RX ADMIN — SODIUM FERRIC GLUCONATE COMPLEX SCH MLS/HR: 12.5 INJECTION INTRAVENOUS at 13:35

## 2019-08-04 RX ADMIN — MINOXIDIL SCH MG: 10 TABLET ORAL at 08:13

## 2019-08-04 RX ADMIN — AMLODIPINE BESYLATE 1 MG: 5 TABLET ORAL at 08:13

## 2019-08-04 RX ADMIN — FUROSEMIDE 1 MG: 40 TABLET ORAL at 08:13

## 2019-08-04 RX ADMIN — TIMOLOL MALEATE SCH DROP: 5 SOLUTION/ DROPS OPHTHALMIC at 20:40

## 2019-08-04 RX ADMIN — MINOXIDIL 1 MG: 10 TABLET ORAL at 08:13

## 2019-08-04 RX ADMIN — POVIDONE-IODINE SCH APPLIC: 100 OINTMENT TOPICAL at 08:14

## 2019-08-04 RX ADMIN — CEFAZOLIN SCH MLS/HR: 1 INJECTION, POWDER, FOR SOLUTION INTRAMUSCULAR; INTRAVENOUS at 21:19

## 2019-08-04 RX ADMIN — INSULIN ASPART 1 UNIT: 100 INJECTION, SOLUTION INTRAVENOUS; SUBCUTANEOUS at 17:24

## 2019-08-04 RX ADMIN — INSULIN ASPART 1 UNIT: 100 INJECTION, SOLUTION INTRAVENOUS; SUBCUTANEOUS at 08:06

## 2019-08-04 RX ADMIN — HEPARIN SODIUM SCH UNIT: 5000 INJECTION, SOLUTION INTRAVENOUS; SUBCUTANEOUS at 20:44

## 2019-08-04 RX ADMIN — COLLAGENASE SANTYL 1 APPLIC: 250 OINTMENT TOPICAL at 08:14

## 2019-08-04 RX ADMIN — TIMOLOL MALEATE 1 DROP: 5 SOLUTION/ DROPS OPHTHALMIC at 20:40

## 2019-08-04 RX ADMIN — POVIDONE-IODINE 1 APPLIC: 100 OINTMENT TOPICAL at 08:14

## 2019-08-04 RX ADMIN — BRIMONIDINE TARTRATE SCH DROP: 2 SOLUTION OPHTHALMIC at 20:40

## 2019-08-04 RX ADMIN — INSULIN ASPART 1 UNIT: 100 INJECTION, SOLUTION INTRAVENOUS; SUBCUTANEOUS at 08:08

## 2019-08-04 RX ADMIN — CEFTRIAXONE SCH MLS/HR: 1 INJECTION, SOLUTION INTRAVENOUS at 00:38

## 2019-08-04 RX ADMIN — AMLODIPINE BESYLATE SCH MG: 5 TABLET ORAL at 08:13

## 2019-08-04 RX ADMIN — TIMOLOL MALEATE 1 DROP: 5 SOLUTION/ DROPS OPHTHALMIC at 08:12

## 2019-08-04 RX ADMIN — DOCUSATE SODIUM 1 MG: 100 CAPSULE, LIQUID FILLED ORAL at 08:12

## 2019-08-04 RX ADMIN — LINAGLIPTIN 1 MG: 5 TABLET, FILM COATED ORAL at 08:11

## 2019-08-04 RX ADMIN — BRIMONIDINE TARTRATE 1 DROP: 2 SOLUTION OPHTHALMIC at 08:12

## 2019-08-04 RX ADMIN — BRIMONIDINE TARTRATE 1 DROP: 2 SOLUTION OPHTHALMIC at 20:40

## 2019-08-04 RX ADMIN — INSULIN GLARGINE 1 UNITS: 100 INJECTION, SOLUTION SUBCUTANEOUS at 08:10

## 2019-08-04 RX ADMIN — PANTOPRAZOLE SODIUM 1 MG: 40 TABLET, DELAYED RELEASE ORAL at 06:10

## 2019-08-04 RX ADMIN — CEFTRIAXONE 1 MLS/HR: 1 INJECTION, SOLUTION INTRAVENOUS at 00:38

## 2019-08-04 RX ADMIN — INSULIN ASPART 1 UNIT: 100 INJECTION, SOLUTION INTRAVENOUS; SUBCUTANEOUS at 17:23

## 2019-08-04 RX ADMIN — Medication 1 TAB: at 08:11

## 2019-08-04 RX ADMIN — BRIMONIDINE TARTRATE SCH DROP: 2 SOLUTION OPHTHALMIC at 08:12

## 2019-08-04 RX ADMIN — SODIUM FERRIC GLUCONATE COMPLEX 1 MLS/HR: 12.5 INJECTION INTRAVENOUS at 13:35

## 2019-08-04 RX ADMIN — INSULIN GLARGINE SCH UNITS: 100 INJECTION, SOLUTION SUBCUTANEOUS at 08:10

## 2019-08-04 RX ADMIN — INSULIN GLARGINE SCH UNITS: 100 INJECTION, SOLUTION SUBCUTANEOUS at 20:43

## 2019-08-04 RX ADMIN — ATORVASTATIN CALCIUM SCH MG: 20 TABLET, FILM COATED ORAL at 20:40

## 2019-08-04 RX ADMIN — ATORVASTATIN CALCIUM 1 MG: 20 TABLET, FILM COATED ORAL at 20:40

## 2019-08-04 RX ADMIN — INSULIN GLARGINE 1 UNITS: 100 INJECTION, SOLUTION SUBCUTANEOUS at 20:43

## 2019-08-04 RX ADMIN — FUROSEMIDE SCH MG: 40 TABLET ORAL at 08:13

## 2019-08-04 RX ADMIN — PANTOPRAZOLE SODIUM SCH MG: 40 TABLET, DELAYED RELEASE ORAL at 06:10

## 2019-08-04 RX ADMIN — HEPARIN SODIUM 1 UNIT: 5000 INJECTION, SOLUTION INTRAVENOUS; SUBCUTANEOUS at 08:11

## 2019-08-04 RX ADMIN — HEPARIN SODIUM 1 UNIT: 5000 INJECTION, SOLUTION INTRAVENOUS; SUBCUTANEOUS at 20:44

## 2019-08-04 RX ADMIN — HEPARIN SODIUM SCH UNIT: 5000 INJECTION, SOLUTION INTRAVENOUS; SUBCUTANEOUS at 08:11

## 2019-08-04 RX ADMIN — LINAGLIPTIN SCH MG: 5 TABLET, FILM COATED ORAL at 08:11

## 2019-08-04 RX ADMIN — COLLAGENASE SANTYL SCH APPLIC: 250 OINTMENT TOPICAL at 08:14

## 2019-08-04 RX ADMIN — DOCUSATE SODIUM SCH MG: 100 CAPSULE, LIQUID FILLED ORAL at 08:12

## 2019-08-04 RX ADMIN — TIMOLOL MALEATE SCH DROP: 5 SOLUTION/ DROPS OPHTHALMIC at 08:12

## 2019-08-04 RX ADMIN — INSULIN ASPART 1 UNIT: 100 INJECTION, SOLUTION INTRAVENOUS; SUBCUTANEOUS at 20:41

## 2019-08-04 RX ADMIN — INSULIN ASPART 1 UNIT: 100 INJECTION, SOLUTION INTRAVENOUS; SUBCUTANEOUS at 12:29

## 2019-08-04 NOTE — PN
Date/Time of Note


Date/Time of Note


DATE: 8/4/19 


TIME: 13:45





Assessment/Plan


VTE Prophylaxis


Risk score (from Ns)>0 risk:  6


SCD applied (from [facility] [place]  No


SCD contraindicated:  bilateral LE trauma


Pharmacological prophylaxis:  heparin





Lines/Catheters


IV Catheter Type (from [facility]):  Saline Lock


Urinary Cath still in place:  No





Assessment/Plan


Hospital Course


1. right foot cellulitis with necrotic wound, pt has redness and edema only for 


5 days. pt has PAD. s/p Right foot incision and drainage 8/3/2019


2. Hypertension, controlled


3. Obesity,  


4. Diabetes mellitus type II , BS is uncontrolled


5. ESRD on HD services, MWF


6. Anemia chronic disease, also iron deficiency


7. Hyperkalemia 2/2 ESRD, resolved after HD


8. S/p abd. laparotomy,hx of diverticulosis


9.  s/p left foot 3 toe amputation,


10.  s.p bilateral lenses replacement,


11.  s/p left arm AV fistula creation


12. UTI


13. Non compliance, pt refusing lovenox


Assessment/Plan


-c.w a/b


-waiting cultures


-wound care


-f/w ID , surgical and podiatry rec.


Result Diagram:  


8/4/19 0441 8/4/19 0441





Results 24hrs





Laboratory Tests


Test
                     8/3/19
17:32  8/3/19
20:42  8/4/19
02:07  8/4/19
04:41


Bedside Glucose                 231  H         214           216


White Blood Count                                                         8.2  #


Red Blood Count                                                          2.98  L


Hemoglobin                                                                9.1  L


Hematocrit                                                               27.5  L


Mean Corpuscular Volume                                                   92.3


Mean Corpuscular                                                          30.5


Hemoglobin


Mean Corpuscular          
             
             
                  33.1  



Hemoglobin
Concent


Red Cell Distribution                                                     14.1


Width


Platelet Count                                                             217


Mean Platelet Volume                                                       9.7


Immature Granulocytes %                                                 2.600  H


Neutrophils %                                                             61.2


Lymphocytes %                                                             17.2


Monocytes %                                                              14.6  H


Eosinophils %                                                              4.0


Basophils %                                                                0.4


Nucleated Red Blood                                                        0.0


Cells %


Immature Granulocytes #                                                 0.210  H


Neutrophils #                                                              5.0


Lymphocytes #                                                              1.4


Monocytes #                                                               1.2  H


Eosinophils #                                                              0.3


Basophils #                                                                0.0


Nucleated Red Blood                                                        0.0


Cells #


Sodium Level                                                               136


Potassium Level                                                            4.5


Chloride Level                                                              99


Carbon Dioxide Level                                                        23


Anion Gap                                                                  14  H


Blood Urea Nitrogen                                                        66  H


Creatinine                                                              7.94  #H


Est Glomerular Filtrat    
             
             
                    7  L



Rate
mL/min


Glucose Level                                                              190


Calcium Level                                                             8.3  L


Test
                     8/4/19
08:03  8/4/19
12:26  
             



Bedside Glucose                  170           188








Subjective


24 Hr Interval Summary


Free Text/Dictation


no events, no fever





Exam/Review of Systems


Exam


Vitals





Vital Signs


  Date      Temp  Pulse  Resp  B/P (MAP)   Pulse Ox  O2          O2 Flow    FiO2


Time                                                 Delivery    Rate


    8/4/19  98.8     84    19      132/60        95  Room Air


     08:00                           (84)


    8/3/19                                                             2.0


     08:58








Intake and Output





8/3/19


8/3/19


8/4/19





1515:00


23:00


07:00





IntakeIntake Total


1000 ml


250 ml


50 ml





BalanceBalance


1000 ml


250 ml


50 ml











Constitutional:  alert, oriented


Psych:  no complaints


Head:  normocephalic


Eyes:  nl conjunctiva


ENMT:  nl external ears & nose


Respiratory:  clear to auscultation


Cardiovascular:  regular rate and rhythm


Gastrointestinal:  soft


Genitourinary - Male:  CVA tenderness; No nl penis, No nl scrotum, No discharge,


No other


Musculoskeletal:  other (right foot with surgical dressing)





Results


Results 24hrs





Laboratory Tests


Test
                     8/3/19
17:32  8/3/19
20:42  8/4/19
02:07  8/4/19
04:41


Bedside Glucose                 231  H         214           216


White Blood Count                                                         8.2  #


Red Blood Count                                                          2.98  L


Hemoglobin                                                                9.1  L


Hematocrit                                                               27.5  L


Mean Corpuscular Volume                                                   92.3


Mean Corpuscular                                                          30.5


Hemoglobin


Mean Corpuscular          
             
             
                  33.1  



Hemoglobin
Concent


Red Cell Distribution                                                     14.1


Width


Platelet Count                                                             217


Mean Platelet Volume                                                       9.7


Immature Granulocytes %                                                 2.600  H


Neutrophils %                                                             61.2


Lymphocytes %                                                             17.2


Monocytes %                                                              14.6  H


Eosinophils %                                                              4.0


Basophils %                                                                0.4


Nucleated Red Blood                                                        0.0


Cells %


Immature Granulocytes #                                                 0.210  H


Neutrophils #                                                              5.0


Lymphocytes #                                                              1.4


Monocytes #                                                               1.2  H


Eosinophils #                                                              0.3


Basophils #                                                                0.0


Nucleated Red Blood                                                        0.0


Cells #


Sodium Level                                                               136


Potassium Level                                                            4.5


Chloride Level                                                              99


Carbon Dioxide Level                                                        23


Anion Gap                                                                  14  H


Blood Urea Nitrogen                                                        66  H


Creatinine                                                              7.94  #H


Est Glomerular Filtrat    
             
             
                    7  L



Rate
mL/min


Glucose Level                                                              190


Calcium Level                                                             8.3  L


Test
                     8/4/19
08:03  8/4/19
12:26  
             



Bedside Glucose                  170           188








Medications


Medication





Current Medications


Diagnostic Test (Pha) (Accu-Chek) 1 ea 02 XX ;  Start 8/2/19 at 02:00;  Status 


Hold


Ceftriaxone Sodium 50 ml @  100 mls/hr Q24H IVPB  Last administered on 8/4/19at 


00:38; Admin Dose 100 MLS/HR;  Start 8/2/19 at 00:30


Acetaminophen (Tylenol Tab) 650 mg Q6H  PRN PO MILD PAIN(1-3)OR ELEVATED TEMP;  


Start 8/2/19 at 00:30


Zolpidem Tartrate (Ambien) 5 mg HS  PRN PO INSOMNIA Last administered on 


8/2/19at 01:17; Admin Dose 5 MG;  Start 8/2/19 at 00:30


Timolol Maleate (Timoptic 0.5%) 1 drop BID BOTH EYES  Last administered on 


8/4/19at 08:12; Admin Dose 1 DROP;  Start 8/2/19 at 09:00


Amlodipine Besylate (Norvasc) 5 mg DAILY PO  Last administered on 8/4/19at 


08:13; Admin Dose 5 MG;  Start 8/2/19 at 09:00


Atorvastatin Calcium (Lipitor) 20 mg QHS PO  Last administered on 8/3/19at 20:4


4; Admin Dose 20 MG;  Start 8/2/19 at 21:00


Docusate Sodium (Colace) 200 mg DAILY PO  Last administered on 8/4/19at 08:12; 


Admin Dose 200 MG;  Start 8/2/19 at 09:00


Furosemide (Lasix) 40 mg DAILY PO  Last administered on 8/4/19at 08:13; Admin 


Dose 40 MG;  Start 8/2/19 at 09:00


Minoxidil (Loniten) 10 mg DAILY PO  Last administered on 8/4/19at 08:13; Admin 


Dose 10 MG;  Start 8/2/19 at 09:00


Multivit/Ca Carb/ B Cmplx/FA/Prenat (Yen-Barbara) 1 tab DAILY PO  Last 


administered on 8/4/19at 08:11; Admin Dose 1 TAB;  Start 8/2/19 at 09:00


Brimonidine Tartrate (Alphagan 0.2%) 1 drop BID BOTH EYES  Last administered on 


8/4/19at 08:12; Admin Dose 1 DROP;  Start 8/3/19 at 21:00


Collagenase (Santyl) 1 applic DAILY TOP  Last administered on 8/2/19at 17:30; 


Admin Dose 1 APPLIC;  Start 8/2/19 at 11:30


Miscellaneous Information 1 ea NOTE XX ;  Start 8/2/19 at 11:30


Glucose (Glutose) 15 gm Q15M  PRN PO DECREASED GLUCOSE;  Start 8/2/19 at 11:30


Glucose (Glutose) 22.5 gm Q15M  PRN PO DECREASED GLUCOSE;  Start 8/2/19 at 11:30


Dextrose (D50w Syringe) 25 ml Q15M  PRN IV DECREASED GLUCOSE;  Start 8/2/19 at 


11:30


Dextrose (D50w Syringe) 50 ml Q15M  PRN IV DECREASED GLUCOSE;  Start 8/2/19 at 


11:30


Glucagon (Glucagen) 1 mg Q15M  PRN IM DECREASED GLUCOSE;  Start 8/2/19 at 11:30


Glucose (Glutose) 15 gm Q15M  PRN BUCCAL DECREASED GLUCOSE;  Start 8/2/19 at 


11:30


Pantoprazole (Protonix Tab) 40 mg DAILY@06 PO  Last administered on 8/4/19at 


06:10; Admin Dose 40 MG;  Start 8/2/19 at 16:00


Linagliptin (Tradjenta) 5 mg DAILY PO  Last administered on 8/4/19at 08:11; 


Admin Dose 5 MG;  Start 8/2/19 at 15:00


Insulin Aspart (Novolog Insulin Pen) 5 unit WITH  MEALS SC  Last administered on


8/4/19at 12:29; Admin Dose 5 UNIT;  Start 8/2/19 at 17:35


Vancomycin HCl (Vanco Iv Per Pharmacy) VANCOMYCIN PER PHARMACY PER PROTOCOL XX ;


 Start 8/2/19 at 15:30


Povidone Iodine (Povidone-Iodine) 1 applic DAILY TOP ;  Start 8/3/19 at 09:00


Prochlorperazine (Compazine Inj) 5 mg PACU ORDER PRN IV NAUSEA/VOMITING;  Start 


8/3/19 at 08:00


Ferric Sodium Gluconate Complex 125 mg/Sodium Chloride 110 ml @  110 mls/hr 


DAILY@1300 IVPB  Last administered on 8/4/19at 13:35; Admin Dose 110 MLS/HR;  


Start 8/4/19 at 13:00;  Stop 8/8/19 at 13:59


Epoetin Rafi-epbx (Retacrit (Esrd)) 4,000 unit MoWeFr@1700 SC ;  Start 8/5/19 at


17:00


Morphine Sulfate (morphine) 2 mg Q4H  PRN IV SEVERE PAIN LEVEL 7-10 Last 


administered on 8/3/19at 17:55; Admin Dose 2 MG;  Start 8/3/19 at 15:00


Acetaminophen/ Hydrocodone Bitart (Norco (5/325)) 1 tab Q6H  PRN PO MODERATE 


PAIN LEVEL 4-6;  Start 8/3/19 at 15:00


Insulin Glargine (Lantus) 20 units BID SC  Last administered on 8/4/19at 08:10; 


Admin Dose 20 UNITS;  Start 8/3/19 at 21:00


Heparin Sodium (Porcine) (Heparin  (5000 Units/1ml)) 5,000 unit BID SC  Last 


administered on 8/4/19at 08:11; Admin Dose 5,000 UNIT;  Start 8/3/19 at 21:00


Insulin Aspart (Novolog Insulin Pen) (Adult SC Insulin - Moder... WITH MEALS  


BEDTIME SC  Last administered on 8/4/19at 12:29; Admin Dose 4 UNIT;  Start 


8/3/19 at 18:05











TYSON HARVEY NP              Aug 4, 2019 13:56

## 2019-08-04 NOTE — CONS
Assessment/Plan


Assessment/Plan


Hospital Course (Demo Recall)


ID PROGRESS NOTE


CURRENT ABX: DAY #  =>   VANCO IV + CEFTRIAXONE 


8/4/19 0441                                                                     


          8/4/19 0441


24H INTERVAL SUMMARY


* Resting with eyes closed, low grade Tmax 99.+, VSS, NAD, Chart reviewed 


DIAGNOSTIC IMAGING 


* 08/03/19 FOOT MRI:


   *  1.  Dorsal soft tissue swelling with a focus of soft tissue inflammation 


     dorsal to the proximal aspect of the first metatarsal and a possible small 


     elongated fluid collection measuring 1 cm in its longest dimension. 


     Findings could be focal cellulitis, phlegmon or small abscess.


   * 2.  No evidence for osteomyelitis.


   * 3.  No evidence for soft tissue air to suggest gangrene.


MICRO


* 08/03/19 I&D WOUND CX:  WOUND CULTURE  Preliminary  


     Organism 1                     STAPHYLOCOCCUS AUREUS


        QUANTITY                    1+





* 08/01/19   WOUND CULTURE  Final  


     Organism 1                     STAPHYLOCOCCUS AUREUS


        QUANTITY                    SCANT GROWTH





                                    S AUREUS         


                                    M.I.C.    RX     


                                    --------- ---    


     CEFAZOLIN                                 S     


     CIPROFLOXACIN                  >=8        R     


     CLINDAMYCIN                    <=0.25     S     


     DOXYCYCLINE                               S     


     ERYTHROMYCIN                   <=0.25     S     


     LEVOFLOXACIN                   4          R     


     OXACILLIN                      0.5        S     


     PENICILLIN-G                   >=0.5      R     


     RIFAMPIN                       <=0.5      S     


     VANCOMYCIN                     1          S     


     TRIMETHOPRIM/SULFAMETHOXAZOLE  >=320      R     





* 08/01/19 BCx (-) 





PHYSICAL EXAMINATION:


GENERAL: VSS, NAD


HEENT: AT, NC, 


NECK:  Supple, 


CHEST: Rise symmetrical 


HEART:  Pulse RRR


ABDOMEN:  


EXTREMITIES:  Warm, dry  == DSG C/D/I 


SKIN: No rash, no diaphoresis





ID ASSESSMENT


60 yo M admit with: 


1.  Right foot abscess.


2.  Right foot cellulitis.


3.  Gangrene.


4.  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


5.  History of partial right third toe amputation.


6.  ESRD-> HD SINCE OCT 2018 


 (?) MRSA Nares


ABX ALLERGIES: KNDA


INVASIVES:  


CURRENT ABX: DAY #  => VANCO IV + CEFTRIAXONE





ID RECOMMENDATIONS/PLAN:  


1. Continue Vanco IV for now == Patient is ESRD on HD, easy to get Vanco IV ABX 


@ HD center 


2. Change Ceftriaxone to ANCEF == ABX of choice for (+)IRMA -- pharmacy may dose


per renal


3. ID Team Colleagues to f/u next week 


.





Consultation Date/Type/Reason


Admit Date/Time


Aug 1, 2019 at 19:35


Initial Consult Date





Date/Time of Note


DATE: 8/4/19 


TIME: 17:04





Exam/Review of Systems


Exam


Vitals





Vital Signs


  Date      Temp  Pulse  Resp  B/P (MAP)   Pulse Ox  O2          O2 Flow    FiO2


Time                                                 Delivery    Rate


    8/4/19  97.8     85    18      145/66        95  Room Air


     14:00                           (92)


    8/3/19                                                             2.0


     08:58








Intake and Output





8/3/19


8/3/19


8/4/19





1515:00


23:00


07:00





IntakeIntake Total


1000 ml


250 ml


50 ml





BalanceBalance


1000 ml


250 ml


50 ml














Results


Result Diagram:  


8/4/19 0441                                                                     


          8/4/19 0441





Results 24hrs





Laboratory Tests


Test
                     8/3/19
17:32  8/3/19
20:42  8/4/19
02:07  8/4/19
04:41


Bedside Glucose                 231  H         214           216


White Blood Count                                                         8.2  #


Red Blood Count                                                          2.98  L


Hemoglobin                                                                9.1  L


Hematocrit                                                               27.5  L


Mean Corpuscular Volume                                                   92.3


Mean Corpuscular                                                          30.5


Hemoglobin


Mean Corpuscular          
             
             
                  33.1  



Hemoglobin
Concent


Red Cell Distribution                                                     14.1


Width


Platelet Count                                                             217


Mean Platelet Volume                                                       9.7


Immature Granulocytes %                                                 2.600  H


Neutrophils %                                                             61.2


Lymphocytes %                                                             17.2


Monocytes %                                                              14.6  H


Eosinophils %                                                              4.0


Basophils %                                                                0.4


Nucleated Red Blood                                                        0.0


Cells %


Immature Granulocytes #                                                 0.210  H


Neutrophils #                                                              5.0


Lymphocytes #                                                              1.4


Monocytes #                                                               1.2  H


Eosinophils #                                                              0.3


Basophils #                                                                0.0


Nucleated Red Blood                                                        0.0


Cells #


Sodium Level                                                               136


Potassium Level                                                            4.5


Chloride Level                                                              99


Carbon Dioxide Level                                                        23


Anion Gap                                                                  14  H


Blood Urea Nitrogen                                                        66  H


Creatinine                                                              7.94  #H


Est Glomerular Filtrat    
             
             
                    7  L



Rate
mL/min


Glucose Level                                                              190


Calcium Level                                                             8.3  L


Test
                     8/4/19
08:03  8/4/19
12:26  
             



Bedside Glucose                  170           188








Medications


Medication





Current Medications


Diagnostic Test (Pha) (Accu-Chek) 1 ea 02 XX ;  Start 8/2/19 at 02:00;  Status 


Hold


Ceftriaxone Sodium 50 ml @  100 mls/hr Q24H IVPB  Last administered on 8/4/19at 


00:38; Admin Dose 100 MLS/HR;  Start 8/2/19 at 00:30


Acetaminophen (Tylenol Tab) 650 mg Q6H  PRN PO MILD PAIN(1-3)OR ELEVATED TEMP;  


Start 8/2/19 at 00:30


Zolpidem Tartrate (Ambien) 5 mg HS  PRN PO INSOMNIA Last administered on 


8/2/19at 01:17; Admin Dose 5 MG;  Start 8/2/19 at 00:30


Timolol Maleate (Timoptic 0.5%) 1 drop BID BOTH EYES  Last administered on 


8/4/19at 08:12; Admin Dose 1 DROP;  Start 8/2/19 at 09:00


Amlodipine Besylate (Norvasc) 5 mg DAILY PO  Last administered on 8/4/19at 


08:13; Admin Dose 5 MG;  Start 8/2/19 at 09:00


Atorvastatin Calcium (Lipitor) 20 mg QHS PO  Last administered on 8/3/19at 


20:44; Admin Dose 20 MG;  Start 8/2/19 at 21:00


Docusate Sodium (Colace) 200 mg DAILY PO  Last administered on 8/4/19at 08:12; 


Admin Dose 200 MG;  Start 8/2/19 at 09:00


Furosemide (Lasix) 40 mg DAILY PO  Last administered on 8/4/19at 08:13; Admin 


Dose 40 MG;  Start 8/2/19 at 09:00


Minoxidil (Loniten) 10 mg DAILY PO  Last administered on 8/4/19at 08:13; Admin 


Dose 10 MG;  Start 8/2/19 at 09:00


Multivit/Ca Carb/ B Cmplx/FA/Prenat (Yen-Barbara) 1 tab DAILY PO  Last 


administered on 8/4/19at 08:11; Admin Dose 1 TAB;  Start 8/2/19 at 09:00


Brimonidine Tartrate (Alphagan 0.2%) 1 drop BID BOTH EYES  Last administered on 


8/4/19at 08:12; Admin Dose 1 DROP;  Start 8/3/19 at 21:00


Collagenase (Santyl) 1 applic DAILY TOP  Last administered on 8/2/19at 17:30; 


Admin Dose 1 APPLIC;  Start 8/2/19 at 11:30


Miscellaneous Information 1 ea NOTE XX ;  Start 8/2/19 at 11:30


Glucose (Glutose) 15 gm Q15M  PRN PO DECREASED GLUCOSE;  Start 8/2/19 at 11:30


Glucose (Glutose) 22.5 gm Q15M  PRN PO DECREASED GLUCOSE;  Start 8/2/19 at 11:30


Dextrose (D50w Syringe) 25 ml Q15M  PRN IV DECREASED GLUCOSE;  Start 8/2/19 at 


11:30


Dextrose (D50w Syringe) 50 ml Q15M  PRN IV DECREASED GLUCOSE;  Start 8/2/19 at 


11:30


Glucagon (Glucagen) 1 mg Q15M  PRN IM DECREASED GLUCOSE;  Start 8/2/19 at 11:30


Glucose (Glutose) 15 gm Q15M  PRN BUCCAL DECREASED GLUCOSE;  Start 8/2/19 at 


11:30


Pantoprazole (Protonix Tab) 40 mg DAILY@06 PO  Last administered on 8/4/19at 


06:10; Admin Dose 40 MG;  Start 8/2/19 at 16:00


Linagliptin (Tradjenta) 5 mg DAILY PO  Last administered on 8/4/19at 08:11; Adm


in Dose 5 MG;  Start 8/2/19 at 15:00


Insulin Aspart (Novolog Insulin Pen) 5 unit WITH  MEALS SC  Last administered on


8/4/19at 12:29; Admin Dose 5 UNIT;  Start 8/2/19 at 17:35


Vancomycin HCl (Vanco Iv Per Pharmacy) VANCOMYCIN PER PHARMACY PER PROTOCOL XX ;


 Start 8/2/19 at 15:30


Povidone Iodine (Povidone-Iodine) 1 applic DAILY TOP ;  Start 8/3/19 at 09:00


Prochlorperazine (Compazine Inj) 5 mg PACU ORDER PRN IV NAUSEA/VOMITING;  Start 


8/3/19 at 08:00


Ferric Sodium Gluconate Complex 125 mg/Sodium Chloride 110 ml @  110 mls/hr 


DAILY@1300 IVPB  Last administered on 8/4/19at 13:35; Admin Dose 110 MLS/HR;  


Start 8/4/19 at 13:00;  Stop 8/8/19 at 13:59


Epoetin Rafi-epbx (Retacrit (Esrd)) 4,000 unit MoWeFr@1700 SC ;  Start 8/5/19 at


17:00


Morphine Sulfate (morphine) 2 mg Q4H  PRN IV SEVERE PAIN LEVEL 7-10 Last 


administered on 8/3/19at 17:55; Admin Dose 2 MG;  Start 8/3/19 at 15:00


Acetaminophen/ Hydrocodone Bitart (Norco (5/325)) 1 tab Q6H  PRN PO MODERATE 


PAIN LEVEL 4-6;  Start 8/3/19 at 15:00


Insulin Glargine (Lantus) 20 units BID SC  Last administered on 8/4/19at 08:10; 


Admin Dose 20 UNITS;  Start 8/3/19 at 21:00


Heparin Sodium (Porcine) (Heparin  (5000 Units/1ml)) 5,000 unit BID SC  Last 


administered on 8/4/19at 08:11; Admin Dose 5,000 UNIT;  Start 8/3/19 at 21:00


Insulin Aspart (Novolog Insulin Pen) (Adult SC Insulin - Moder... WITH MEALS  


BEDTIME SC  Last administered on 8/4/19at 12:29; Admin Dose 4 UNIT;  Start 


8/3/19 at 18:05











IRAIDA GRANDA NP               Aug 4, 2019 17:10

## 2019-08-04 NOTE — CONS
DATE OF ADMISSION: 08/01/2019

DATE OF CONSULTATION:  08/04/2019

 

 

 

SUBJECTIVE FINDINGS:  The patient is postoperative day #1, incision and drainage of the right foot an
d debridement of adjacent ulceration.  Cultures pending.  The patient relates less pain to the right 
foot.

 

OBJECTIVE FINDINGS:

VITAL SIGNS:  Temperature 98.8, pulse is 84, respiratory rate 19, blood pressure is 132/60, pulse ox 
is 95% on room air.

EXTREMITIES:  Right foot with decreased erythema, ulceration, dorsal aspect without any drainage, inc
ision dry.  Foot is warm.  Cultures pending.

 

LABORATORIES:  WBC 8.2, hemoglobin 9.1, hematocrit 27.5, platelets 217.

 

ASSESSMENT:

1.  Right foot abscess, status post incision and drainage.

2.  Cellulitis.

3.  Diabetes type 2.

4.  Peripheral neuropathy.

 

PLAN:  Dressings were changed.  Wound was irrigated.  Will consider possible delayed primary closure 
pending clinical appearance.  The patient is currently on vancomycin and ceftriaxone.

 

 

Dictated By: DUNCAN CURTIS/MOUNIKA

DD:    08/04/2019 11:36:03

DT:    08/04/2019 14:12:50

Conf#: [id number]

DID#:  [id number]

## 2019-08-05 VITALS — SYSTOLIC BLOOD PRESSURE: 140 MMHG | DIASTOLIC BLOOD PRESSURE: 57 MMHG | HEART RATE: 90 BPM

## 2019-08-05 VITALS — HEART RATE: 90 BPM | DIASTOLIC BLOOD PRESSURE: 65 MMHG | SYSTOLIC BLOOD PRESSURE: 140 MMHG | RESPIRATION RATE: 19 BRPM

## 2019-08-05 VITALS — HEART RATE: 87 BPM | DIASTOLIC BLOOD PRESSURE: 60 MMHG | SYSTOLIC BLOOD PRESSURE: 146 MMHG

## 2019-08-05 VITALS — RESPIRATION RATE: 18 BRPM | DIASTOLIC BLOOD PRESSURE: 60 MMHG | SYSTOLIC BLOOD PRESSURE: 148 MMHG | HEART RATE: 86 BPM

## 2019-08-05 VITALS — RESPIRATION RATE: 18 BRPM | DIASTOLIC BLOOD PRESSURE: 74 MMHG | SYSTOLIC BLOOD PRESSURE: 167 MMHG | HEART RATE: 92 BPM

## 2019-08-05 VITALS — HEART RATE: 98 BPM | RESPIRATION RATE: 18 BRPM | SYSTOLIC BLOOD PRESSURE: 137 MMHG | DIASTOLIC BLOOD PRESSURE: 62 MMHG

## 2019-08-05 VITALS — DIASTOLIC BLOOD PRESSURE: 64 MMHG | HEART RATE: 82 BPM | SYSTOLIC BLOOD PRESSURE: 134 MMHG

## 2019-08-05 VITALS — DIASTOLIC BLOOD PRESSURE: 78 MMHG | SYSTOLIC BLOOD PRESSURE: 130 MMHG | RESPIRATION RATE: 18 BRPM | HEART RATE: 86 BPM

## 2019-08-05 VITALS — SYSTOLIC BLOOD PRESSURE: 141 MMHG | HEART RATE: 85 BPM | DIASTOLIC BLOOD PRESSURE: 62 MMHG

## 2019-08-05 VITALS — SYSTOLIC BLOOD PRESSURE: 133 MMHG | HEART RATE: 83 BPM | DIASTOLIC BLOOD PRESSURE: 57 MMHG

## 2019-08-05 VITALS — HEART RATE: 87 BPM | DIASTOLIC BLOOD PRESSURE: 58 MMHG | SYSTOLIC BLOOD PRESSURE: 146 MMHG

## 2019-08-05 VITALS — DIASTOLIC BLOOD PRESSURE: 55 MMHG | HEART RATE: 89 BPM | SYSTOLIC BLOOD PRESSURE: 137 MMHG

## 2019-08-05 VITALS — HEART RATE: 85 BPM | DIASTOLIC BLOOD PRESSURE: 59 MMHG | SYSTOLIC BLOOD PRESSURE: 139 MMHG

## 2019-08-05 VITALS — DIASTOLIC BLOOD PRESSURE: 69 MMHG | SYSTOLIC BLOOD PRESSURE: 143 MMHG | HEART RATE: 87 BPM

## 2019-08-05 RX ADMIN — COLLAGENASE SANTYL 1 APPLIC: 250 OINTMENT TOPICAL at 08:06

## 2019-08-05 RX ADMIN — TIMOLOL MALEATE 1 DROP: 5 SOLUTION/ DROPS OPHTHALMIC at 20:54

## 2019-08-05 RX ADMIN — LINAGLIPTIN 1 MG: 5 TABLET, FILM COATED ORAL at 08:03

## 2019-08-05 RX ADMIN — COLLAGENASE SANTYL SCH APPLIC: 250 OINTMENT TOPICAL at 08:06

## 2019-08-05 RX ADMIN — HEPARIN SODIUM SCH UNIT: 5000 INJECTION, SOLUTION INTRAVENOUS; SUBCUTANEOUS at 20:55

## 2019-08-05 RX ADMIN — INSULIN ASPART 1 UNIT: 100 INJECTION, SOLUTION INTRAVENOUS; SUBCUTANEOUS at 12:12

## 2019-08-05 RX ADMIN — INSULIN ASPART 1 UNIT: 100 INJECTION, SOLUTION INTRAVENOUS; SUBCUTANEOUS at 20:57

## 2019-08-05 RX ADMIN — EPOETIN ALFA-EPBX 1 UNIT: 4000 INJECTION, SOLUTION INTRAVENOUS; SUBCUTANEOUS at 16:41

## 2019-08-05 RX ADMIN — SODIUM FERRIC GLUCONATE COMPLEX 1 MLS/HR: 12.5 INJECTION INTRAVENOUS at 12:12

## 2019-08-05 RX ADMIN — MINOXIDIL SCH MG: 10 TABLET ORAL at 08:38

## 2019-08-05 RX ADMIN — HEPARIN SODIUM 1 UNIT: 5000 INJECTION, SOLUTION INTRAVENOUS; SUBCUTANEOUS at 08:02

## 2019-08-05 RX ADMIN — BRIMONIDINE TARTRATE SCH DROP: 2 SOLUTION OPHTHALMIC at 08:03

## 2019-08-05 RX ADMIN — INSULIN ASPART 1 UNIT: 100 INJECTION, SOLUTION INTRAVENOUS; SUBCUTANEOUS at 08:00

## 2019-08-05 RX ADMIN — HEPARIN SODIUM 1 UNIT: 5000 INJECTION, SOLUTION INTRAVENOUS; SUBCUTANEOUS at 20:55

## 2019-08-05 RX ADMIN — TIMOLOL MALEATE 1 DROP: 5 SOLUTION/ DROPS OPHTHALMIC at 08:02

## 2019-08-05 RX ADMIN — HEPARIN SODIUM SCH UNIT: 5000 INJECTION, SOLUTION INTRAVENOUS; SUBCUTANEOUS at 08:02

## 2019-08-05 RX ADMIN — AMLODIPINE BESYLATE SCH MG: 5 TABLET ORAL at 08:38

## 2019-08-05 RX ADMIN — INSULIN GLARGINE 1 UNITS: 100 INJECTION, SOLUTION SUBCUTANEOUS at 08:04

## 2019-08-05 RX ADMIN — MINOXIDIL 1 MG: 10 TABLET ORAL at 08:38

## 2019-08-05 RX ADMIN — LINAGLIPTIN SCH MG: 5 TABLET, FILM COATED ORAL at 08:03

## 2019-08-05 RX ADMIN — INSULIN GLARGINE SCH UNITS: 100 INJECTION, SOLUTION SUBCUTANEOUS at 20:56

## 2019-08-05 RX ADMIN — FUROSEMIDE 1 MG: 40 TABLET ORAL at 08:38

## 2019-08-05 RX ADMIN — Medication 1 TAB: at 08:03

## 2019-08-05 RX ADMIN — TIMOLOL MALEATE SCH DROP: 5 SOLUTION/ DROPS OPHTHALMIC at 08:02

## 2019-08-05 RX ADMIN — FUROSEMIDE SCH MG: 40 TABLET ORAL at 08:38

## 2019-08-05 RX ADMIN — ATORVASTATIN CALCIUM SCH MG: 20 TABLET, FILM COATED ORAL at 20:54

## 2019-08-05 RX ADMIN — INSULIN ASPART 1 UNIT: 100 INJECTION, SOLUTION INTRAVENOUS; SUBCUTANEOUS at 08:01

## 2019-08-05 RX ADMIN — TIMOLOL MALEATE SCH DROP: 5 SOLUTION/ DROPS OPHTHALMIC at 20:54

## 2019-08-05 RX ADMIN — INSULIN GLARGINE SCH UNITS: 100 INJECTION, SOLUTION SUBCUTANEOUS at 08:04

## 2019-08-05 RX ADMIN — POVIDONE-IODINE SCH APPLIC: 100 OINTMENT TOPICAL at 08:05

## 2019-08-05 RX ADMIN — DOCUSATE SODIUM 1 MG: 100 CAPSULE, LIQUID FILLED ORAL at 08:03

## 2019-08-05 RX ADMIN — PANTOPRAZOLE SODIUM 1 MG: 40 TABLET, DELAYED RELEASE ORAL at 06:07

## 2019-08-05 RX ADMIN — INSULIN ASPART 1 UNIT: 100 INJECTION, SOLUTION INTRAVENOUS; SUBCUTANEOUS at 12:11

## 2019-08-05 RX ADMIN — AMLODIPINE BESYLATE 1 MG: 5 TABLET ORAL at 08:38

## 2019-08-05 RX ADMIN — BRIMONIDINE TARTRATE 1 DROP: 2 SOLUTION OPHTHALMIC at 08:03

## 2019-08-05 RX ADMIN — INSULIN ASPART 1 UNIT: 100 INJECTION, SOLUTION INTRAVENOUS; SUBCUTANEOUS at 17:19

## 2019-08-05 RX ADMIN — PANTOPRAZOLE SODIUM SCH MG: 40 TABLET, DELAYED RELEASE ORAL at 06:07

## 2019-08-05 RX ADMIN — POVIDONE-IODINE 1 APPLIC: 100 OINTMENT TOPICAL at 08:05

## 2019-08-05 RX ADMIN — ATORVASTATIN CALCIUM 1 MG: 20 TABLET, FILM COATED ORAL at 20:54

## 2019-08-05 RX ADMIN — BRIMONIDINE TARTRATE 1 DROP: 2 SOLUTION OPHTHALMIC at 20:54

## 2019-08-05 RX ADMIN — SODIUM FERRIC GLUCONATE COMPLEX SCH MLS/HR: 12.5 INJECTION INTRAVENOUS at 12:12

## 2019-08-05 RX ADMIN — DOCUSATE SODIUM SCH MG: 100 CAPSULE, LIQUID FILLED ORAL at 08:03

## 2019-08-05 RX ADMIN — BRIMONIDINE TARTRATE SCH DROP: 2 SOLUTION OPHTHALMIC at 20:54

## 2019-08-05 RX ADMIN — INSULIN GLARGINE 1 UNITS: 100 INJECTION, SOLUTION SUBCUTANEOUS at 20:56

## 2019-08-05 NOTE — CONS
Assessment/Plan


Assessment/Plan


Hospital Course (Demo Recall)


No acute changes overnight patient is alert feels good denies pain no fevers





Wound culture grew oxacillin sensitive staph aureus.





Antimicrobials: Vancomycin and Ancef





Physical examination: Well-developed elderly man who is alert in no distress.  


Head atraumatic normocephalic.  Neck is supple.  Chest rise symmetrical.  Heart:


S1-S2 abdomen soft bowel sounds present





Assessment:


1.  Status post right foot abscess I&D


2.  End-stage renal disease, hemodialysis dependent


3.  Diabetes with diabetic neuropathy





Plan: Patient remains stable podiatry on case we can send him home on IV 


vancomycin to be given at hemodialysis center and either oral Keflex or oral 


clindamycin to complete 2 weeks





Consultation Date/Type/Reason


Admit Date/Time


Aug 1, 2019 at 19:35


Initial Consult Date





Type of Consult


id


Date/Time of Note


DATE: 8/5/19 


TIME: 15:18





Exam/Review of Systems


Exam


Vitals





Vital Signs


  Date      Temp  Pulse  Resp  B/P (MAP)   Pulse Ox  O2          O2 Flow    FiO2


Time                                                 Delivery    Rate


    8/5/19  98.8     90    19      140/65       100


     14:11                           (90)


    8/4/19                                           Room Air


     14:00


    8/3/19                                                             2.0


     08:58








Intake and Output





8/4/19 8/4/19 8/5/19





1515:00


23:00


07:00





IntakeIntake Total


990 ml


750 ml





OutputOutput Total


20 ml


300 ml


3 ml





BalanceBalance


970 ml


450 ml


-3 ml














Results


Result Diagram:  


8/4/19 0441                                                                     


          8/4/19 0441





Results 24hrs





Laboratory Tests


    Test
            8/4/19
17:21  8/4/19
20:39  8/5/19
07:59  8/5/19
12:09


    Bedside Glucose         176           178           133           147








Medications


Medication





Current Medications


Diagnostic Test (Pha) (Accu-Chek) 1 ea 02 XX ;  Start 8/2/19 at 02:00;  Status 


Hold


Acetaminophen (Tylenol Tab) 650 mg Q6H  PRN PO MILD PAIN(1-3)OR ELEVATED TEMP;  


Start 8/2/19 at 00:30


Zolpidem Tartrate (Ambien) 5 mg HS  PRN PO INSOMNIA Last administered on 


8/2/19at 01:17; Admin Dose 5 MG;  Start 8/2/19 at 00:30


Timolol Maleate (Timoptic 0.5%) 1 drop BID BOTH EYES  Last administered on 


8/5/19at 08:02; Admin Dose 1 DROP;  Start 8/2/19 at 09:00


Amlodipine Besylate (Norvasc) 5 mg DAILY PO  Last administered on 8/4/19at 


08:13; Admin Dose 5 MG;  Start 8/2/19 at 09:00


Atorvastatin Calcium (Lipitor) 20 mg QHS PO  Last administered on 8/4/19at 


20:40; Admin Dose 20 MG;  Start 8/2/19 at 21:00


Docusate Sodium (Colace) 200 mg DAILY PO  Last administered on 8/5/19at 08:03; 


Admin Dose 200 MG;  Start 8/2/19 at 09:00


Furosemide (Lasix) 40 mg DAILY PO  Last administered on 8/4/19at 08:13; Admin 


Dose 40 MG;  Start 8/2/19 at 09:00


Multivit/Ca Carb/ B Cmplx/FA/Prenat (Yen-Barbara) 1 tab DAILY PO  Last 


administered on 8/5/19at 08:03; Admin Dose 1 TAB;  Start 8/2/19 at 09:00


Brimonidine Tartrate (Alphagan 0.2%) 1 drop BID BOTH EYES  Last administered on 


8/5/19at 08:03; Admin Dose 1 DROP;  Start 8/3/19 at 21:00


Collagenase (Santyl) 1 applic DAILY TOP  Last administered on 8/2/19at 17:30; 


Admin Dose 1 APPLIC;  Start 8/2/19 at 11:30


Miscellaneous Information 1 ea NOTE XX ;  Start 8/2/19 at 11:30


Glucose (Glutose) 15 gm Q15M  PRN PO DECREASED GLUCOSE;  Start 8/2/19 at 11:30


Glucose (Glutose) 22.5 gm Q15M  PRN PO DECREASED GLUCOSE;  Start 8/2/19 at 11:30


Dextrose (D50w Syringe) 25 ml Q15M  PRN IV DECREASED GLUCOSE;  Start 8/2/19 at 


11:30


Dextrose (D50w Syringe) 50 ml Q15M  PRN IV DECREASED GLUCOSE;  Start 8/2/19 at 


11:30


Glucagon (Glucagen) 1 mg Q15M  PRN IM DECREASED GLUCOSE;  Start 8/2/19 at 11:30


Glucose (Glutose) 15 gm Q15M  PRN BUCCAL DECREASED GLUCOSE;  Start 8/2/19 at 


11:30


Pantoprazole (Protonix Tab) 40 mg DAILY@06 PO  Last administered on 8/5/19at 


06:07; Admin Dose 40 MG;  Start 8/2/19 at 16:00


Linagliptin (Tradjenta) 5 mg DAILY PO  Last administered on 8/5/19at 08:03; 


Admin Dose 5 MG;  Start 8/2/19 at 15:00


Insulin Aspart (Novolog Insulin Pen) 5 unit WITH  MEALS SC  Last administered on


8/5/19at 12:11; Admin Dose 5 UNIT;  Start 8/2/19 at 17:35


Vancomycin HCl (Vanco Iv Per Pharmacy) VANCOMYCIN PER PHARMACY PER PROTOCOL XX ;


 Start 8/2/19 at 15:30


Povidone Iodine (Povidone-Iodine) 1 applic DAILY TOP ;  Start 8/3/19 at 09:00


Prochlorperazine (Compazine Inj) 5 mg PACU ORDER PRN IV NAUSEA/VOMITING;  Start 


8/3/19 at 08:00


Epoetin Rafi-epbx (Retacrit (Esrd)) 4,000 unit MoWeFr@1700 SC ;  Start 8/5/19 at


17:00


Morphine Sulfate (morphine) 2 mg Q4H  PRN IV SEVERE PAIN LEVEL 7-10 Last 


administered on 8/3/19at 17:55; Admin Dose 2 MG;  Start 8/3/19 at 15:00


Acetaminophen/ Hydrocodone Bitart (Norco (5/325)) 1 tab Q6H  PRN PO MODERATE 


PAIN LEVEL 4-6;  Start 8/3/19 at 15:00


Insulin Glargine (Lantus) 20 units BID SC  Last administered on 8/5/19at 08:04; 


Admin Dose 20 UNITS;  Start 8/3/19 at 21:00


Heparin Sodium (Porcine) (Heparin  (5000 Units/1ml)) 5,000 unit BID SC  Last 


administered on 8/5/19at 08:02; Admin Dose 5,000 UNIT;  Start 8/3/19 at 21:00


Insulin Aspart (Novolog Insulin Pen) (Adult SC Insulin - Moder... WITH MEALS  


BEDTIME SC  Last administered on 8/5/19at 12:12; Admin Dose 2 UNIT;  Start 


8/3/19 at 18:05


Cefazolin Sodium 50 ml @  100 mls/hr Q24H IVPB  Last administered on 8/4/19at 


21:19; Admin Dose 100 MLS/HR;  Start 8/4/19 at 22:00











LYNNETTE VALDERRAMA NP                  Aug 5, 2019 15:18

## 2019-08-05 NOTE — CONS
Assessment/Plan


Assessment/Plan


Assessment/Plan (Daily)


Right foot abscess, status post incision and drainage.


Cellulitis.


Diabetes type 2.


Peripheral neuropathy.


 


PLAN


Continue with daily dressing changes.  Wound cultures showing staph aureus and 


appreciate ID abx recommendations.  Non invasive arterial studies and MRI review


ed.  Pending clinical appearance will plan for delayed primary closure.





Consultation Date/Type/Reason


Admit Date/Time


Aug 1, 2019 at 19:35


Initial Consult Date





Date/Time of Note


DATE: 8/5/19 


TIME: 11:31





24 HR Interval Summary


Free Text/Dictation


No acute events overnight.





Exam/Review of Systems


Exam


Vitals





Vital Signs


  Date      Temp  Pulse  Resp  B/P (MAP)   Pulse Ox  O2          O2 Flow    FiO2


Time                                                 Delivery    Rate


    8/5/19  99.2     86    18      130/78        94


     07:58                           (95)


    8/4/19                                           Room Air


     14:00


    8/3/19                                                             2.0


     08:58








Intake and Output





8/4/19 8/4/19 8/5/19





1515:00


23:00


07:00





IntakeIntake Total


990 ml


750 ml





OutputOutput Total


20 ml


300 ml


3 ml





BalanceBalance


970 ml


450 ml


-3 ml











Exam


Dorsal surgical incision site with temporary suture placement.  There is 


serosanguinous drainage noted


adjacent to the surgical incision site a diabetic ulcer is present fibrotic in 


nature 0.7 x 0.4 x 0.3cm no probing to bone from ulceration site


There is periwound erythema noted


No proximal streaking noted


No pain on palpation


Absent protective sensations. 


Muscle strength 5/5 in all compartments of the foot


Pulses palpable 





Non invasive arterial studies


IMPRESSION:


Monophasic waveforms in the right dorsalis pedis.


No focal area of high degree stenosis.





Foot MRI


IMPRESSION:


1.  Dorsal soft tissue swelling with a focus of soft tissue inflammation dorsal 


to the proximal aspect of the first metatarsal and a possible small elongated 


fluid collection measuring 1 cm in its longest dimension. Findings could be 


focal cellulitis, phlegmon or small abscess.


2.  No evidence for osteomyelitis.


3.  No evidence for soft tissue air to suggest gangrene.





Results


Result Diagram:  


8/4/19 0441                                                                     


          8/4/19 0441





Results 24hrs





Laboratory Tests


    Test
            8/4/19
12:26  8/4/19
17:21  8/4/19
20:39  8/5/19
07:59


    Bedside Glucose         188           176           178           133








Medications


Medication





Current Medications


Diagnostic Test (Pha) (Accu-Chek) 1 ea 02 XX ;  Start 8/2/19 at 02:00;  Status 


Hold


Acetaminophen (Tylenol Tab) 650 mg Q6H  PRN PO MILD PAIN(1-3)OR ELEVATED TEMP;  


Start 8/2/19 at 00:30


Zolpidem Tartrate (Ambien) 5 mg HS  PRN PO INSOMNIA Last administered on 


8/2/19at 01:17; Admin Dose 5 MG;  Start 8/2/19 at 00:30


Timolol Maleate (Timoptic 0.5%) 1 drop BID BOTH EYES  Last administered on 


8/5/19at 08:02; Admin Dose 1 DROP;  Start 8/2/19 at 09:00


Amlodipine Besylate (Norvasc) 5 mg DAILY PO  Last administered on 8/4/19at 


08:13; Admin Dose 5 MG;  Start 8/2/19 at 09:00


Atorvastatin Calcium (Lipitor) 20 mg QHS PO  Last administered on 8/4/19at 


20:40; Admin Dose 20 MG;  Start 8/2/19 at 21:00


Docusate Sodium (Colace) 200 mg DAILY PO  Last administered on 8/5/19at 08:03; 


Admin Dose 200 MG;  Start 8/2/19 at 09:00


Furosemide (Lasix) 40 mg DAILY PO  Last administered on 8/4/19at 08:13; Admin 


Dose 40 MG;  Start 8/2/19 at 09:00


Minoxidil (Loniten) 10 mg DAILY PO  Last administered on 8/4/19at 08:13; Admin 


Dose 10 MG;  Start 8/2/19 at 09:00


Multivit/Ca Carb/ B Cmplx/FA/Prenat (Yen-Barbara) 1 tab DAILY PO  Last 


administered on 8/5/19at 08:03; Admin Dose 1 TAB;  Start 8/2/19 at 09:00


Brimonidine Tartrate (Alphagan 0.2%) 1 drop BID BOTH EYES  Last administered on 


8/5/19at 08:03; Admin Dose 1 DROP;  Start 8/3/19 at 21:00


Collagenase (Santyl) 1 applic DAILY TOP  Last administered on 8/2/19at 17:30; 


Admin Dose 1 APPLIC;  Start 8/2/19 at 11:30


Miscellaneous Information 1 ea NOTE XX ;  Start 8/2/19 at 11:30


Glucose (Glutose) 15 gm Q15M  PRN PO DECREASED GLUCOSE;  Start 8/2/19 at 11:30


Glucose (Glutose) 22.5 gm Q15M  PRN PO DECREASED GLUCOSE;  Start 8/2/19 at 11:30


Dextrose (D50w Syringe) 25 ml Q15M  PRN IV DECREASED GLUCOSE;  Start 8/2/19 at 


11:30


Dextrose (D50w Syringe) 50 ml Q15M  PRN IV DECREASED GLUCOSE;  Start 8/2/19 at 


11:30


Glucagon (Glucagen) 1 mg Q15M  PRN IM DECREASED GLUCOSE;  Start 8/2/19 at 11:30


Glucose (Glutose) 15 gm Q15M  PRN BUCCAL DECREASED GLUCOSE;  Start 8/2/19 at 


11:30


Pantoprazole (Protonix Tab) 40 mg DAILY@06 PO  Last administered on 8/5/19at 


06:07; Admin Dose 40 MG;  Start 8/2/19 at 16:00


Linagliptin (Tradjenta) 5 mg DAILY PO  Last administered on 8/5/19at 08:03; 


Admin Dose 5 MG;  Start 8/2/19 at 15:00


Insulin Aspart (Novolog Insulin Pen) 5 unit WITH  MEALS SC  Last administered on


8/5/19at 08:01; Admin Dose 5 UNIT;  Start 8/2/19 at 17:35


Vancomycin HCl (Vanco Iv Per Pharmacy) VANCOMYCIN PER PHARMACY PER PROTOCOL XX ;


 Start 8/2/19 at 15:30


Povidone Iodine (Povidone-Iodine) 1 applic DAILY TOP ;  Start 8/3/19 at 09:00


Prochlorperazine (Compazine Inj) 5 mg PACU ORDER PRN IV NAUSEA/VOMITING;  Start 


8/3/19 at 08:00


Ferric Sodium Gluconate Complex 125 mg/Sodium Chloride 110 ml @  110 mls/hr 


DAILY@1300 IVPB  Last administered on 8/4/19at 13:35; Admin Dose 110 MLS/HR;  


Start 8/4/19 at 13:00;  Stop 8/8/19 at 13:59


Epoetin Rafi-epbx (Retacrit (Esrd)) 4,000 unit MoWeFr@1700 SC ;  Start 8/5/19 at


17:00


Morphine Sulfate (morphine) 2 mg Q4H  PRN IV SEVERE PAIN LEVEL 7-10 Last 


administered on 8/3/19at 17:55; Admin Dose 2 MG;  Start 8/3/19 at 15:00


Acetaminophen/ Hydrocodone Bitart (Norco (5/325)) 1 tab Q6H  PRN PO MODERATE 


PAIN LEVEL 4-6;  Start 8/3/19 at 15:00


Insulin Glargine (Lantus) 20 units BID SC  Last administered on 8/5/19at 08:04; 


Admin Dose 20 UNITS;  Start 8/3/19 at 21:00


Heparin Sodium (Porcine) (Heparin  (5000 Units/1ml)) 5,000 unit BID SC  Last 


administered on 8/5/19at 08:02; Admin Dose 5,000 UNIT;  Start 8/3/19 at 21:00


Insulin Aspart (Novolog Insulin Pen) (Adult SC Insulin - Moder... WITH MEALS  


BEDTIME SC  Last administered on 8/4/19at 17:24; Admin Dose 2 UNIT;  Start 


8/3/19 at 18:05


Cefazolin Sodium 50 ml @  100 mls/hr Q24H IVPB  Last administered on 8/4/19at 


21:19; Admin Dose 100 MLS/HR;  Start 8/4/19 at 22:00











AYESHA PEÑA DPM              Aug 5, 2019 11:31

## 2019-08-05 NOTE — PN
Date/Time of Note


Date/Time of Note


DATE: 8/5/19 


TIME: 13:53





Assessment/Plan


VTE Prophylaxis


Risk score (from Nsg)>0 risk:  5


SCD applied (from Ns):  No


SCD contraindicated:  low risk/ambulating


Pharmacological prophylaxis:  NA/contraindicated


Pharm contraindication:  low risk/ambulating





Lines/Catheters


IV Catheter Type (from [facility]):  Saline Lock


Urinary Cath still in place:  No





Assessment/Plan


Assessment/Plan


Hospital Course


1. right foot cellulitis with necrotic wound, pt has redness and edema only for 


5 days. . s/p incision and drainage, right foot below fascia with pulse 


irrigation, excisional debridement of ulceration and necrotic skin and 


subcutaneous tissue of less than 20 cm2.on 08/03 cx


+staph


 


2. Hypertension, controlled, lows side


3. Obesity,  


4. Diabetes mellitus type II , BS is uncontrolled


5. ESRD on HD services, [facility]


6. Anemia chronic disease, also iron deficiency


7. Hyperkalemia 2/2 ESRD, resolved after HD


8. S/p abd. laparotomy,hx of diverticulosis


9.  s/p left foot 3 toe amputation,


10.  s.p bilateral lenses replacement,


11.  s/p left arm AV fistula creation


12. UTI


13. Non compliance, pt refusing lovenox


Assessment/Plan


-c.w a/b vancomycin/Ancef per ID


- hd mwf


-bld Cultures have been negative so far


-Hold minoxidil continue with amlodipine and Lasix as bp bordeline


- ?  Surgery per podiatry we will check with them again


-Disposal pending podiatry/ ID  recommendations


-wound care


-CW  with Lantus 20 twice daily Tradjenta


-GI and DVT prophylaxis


Result Diagram:  


8/4/19 0441                                                                     


          8/4/19 0441





Results 24hrs





Laboratory Tests


    Test
            8/4/19
17:21  8/4/19
20:39  8/5/19
07:59  8/5/19
12:09


    Bedside Glucose         176           178           133           147








Subjective


24 Hr Interval Summary


Free Text/Dictation


Low-grade fever.





Exam/Review of Systems


Exam


Vitals





Vital Signs


  Date      Temp  Pulse  Resp  B/P (MAP)   Pulse Ox  O2          O2 Flow    FiO2


Time                                                 Delivery    Rate


    8/5/19  99.2     86    18      130/78        94


     07:58                           (95)


    8/4/19                                           Room Air


     14:00


    8/3/19                                                             2.0


     08:58








Intake and Output





8/4/19 8/4/19 8/5/19





1515:00


23:00


07:00





IntakeIntake Total


990 ml


750 ml





OutputOutput Total


20 ml


300 ml


3 ml





BalanceBalance


970 ml


450 ml


-3 ml











Exam


Psych:  no complaints


Head:  normocephalic


Eyes:  nl conjunctiva


ENMT:  nl external ears & nose


Respiratory:  clear to auscultation


Cardiovascular:  regular rate and rhythm


Gastrointestinal:  soft


Genitourinary - Male:  CVA tenderness; No nl penis, No nl scrotum, No discharge,


No other


Musculoskeletal:  other (right foot with surgical dressing)





Results


Results 24hrs





Laboratory Tests


    Test
            8/4/19
17:21  8/4/19
20:39  8/5/19
07:59  8/5/19
12:09


    Bedside Glucose         176           178           133           147








Medications


Medication





Current Medications


Diagnostic Test (Pha) (Accu-Chek) 1 ea 02 XX ;  Start 8/2/19 at 02:00;  Status 


Hold


Acetaminophen (Tylenol Tab) 650 mg Q6H  PRN PO MILD PAIN(1-3)OR ELEVATED TEMP;  


Start 8/2/19 at 00:30


Zolpidem Tartrate (Ambien) 5 mg HS  PRN PO INSOMNIA Last administered on 


8/2/19at 01:17; Admin Dose 5 MG;  Start 8/2/19 at 00:30


Timolol Maleate (Timoptic 0.5%) 1 drop BID BOTH EYES  Last administered on 


8/5/19at 08:02; Admin Dose 1 DROP;  Start 8/2/19 at 09:00


Amlodipine Besylate (Norvasc) 5 mg DAILY PO  Last administered on 8/4/19at 


08:13; Admin Dose 5 MG;  Start 8/2/19 at 09:00


Atorvastatin Calcium (Lipitor) 20 mg QHS PO  Last administered on 8/4/19at 


20:40; Admin Dose 20 MG;  Start 8/2/19 at 21:00


Docusate Sodium (Colace) 200 mg DAILY PO  Last administered on 8/5/19at 08:03; 


Admin Dose 200 MG;  Start 8/2/19 at 09:00


Furosemide (Lasix) 40 mg DAILY PO  Last administered on 8/4/19at 08:13; Admin 


Dose 40 MG;  Start 8/2/19 at 09:00


Minoxidil (Loniten) 10 mg DAILY PO  Last administered on 8/4/19at 08:13; Admin 


Dose 10 MG;  Start 8/2/19 at 09:00


Multivit/Ca Carb/ B Cmplx/FA/Prenat (Yen-Barbara) 1 tab DAILY PO  Last 


administered on 8/5/19at 08:03; Admin Dose 1 TAB;  Start 8/2/19 at 09:00


Brimonidine Tartrate (Alphagan 0.2%) 1 drop BID BOTH EYES  Last administered on 


8/5/19at 08:03; Admin Dose 1 DROP;  Start 8/3/19 at 21:00


Collagenase (Santyl) 1 applic DAILY TOP  Last administered on 8/2/19at 17:30; 


Admin Dose 1 APPLIC;  Start 8/2/19 at 11:30


Miscellaneous Information 1 ea NOTE XX ;  Start 8/2/19 at 11:30


Glucose (Glutose) 15 gm Q15M  PRN PO DECREASED GLUCOSE;  Start 8/2/19 at 11:30


Glucose (Glutose) 22.5 gm Q15M  PRN PO DECREASED GLUCOSE;  Start 8/2/19 at 11:30


Dextrose (D50w Syringe) 25 ml Q15M  PRN IV DECREASED GLUCOSE;  Start 8/2/19 at 


11:30


Dextrose (D50w Syringe) 50 ml Q15M  PRN IV DECREASED GLUCOSE;  Start 8/2/19 at 


11:30


Glucagon (Glucagen) 1 mg Q15M  PRN IM DECREASED GLUCOSE;  Start 8/2/19 at 11:30


Glucose (Glutose) 15 gm Q15M  PRN BUCCAL DECREASED GLUCOSE;  Start 8/2/19 at 


11:30


Pantoprazole (Protonix Tab) 40 mg DAILY@06 PO  Last administered on 8/5/19at 


06:07; Admin Dose 40 MG;  Start 8/2/19 at 16:00


Linagliptin (Tradjenta) 5 mg DAILY PO  Last administered on 8/5/19at 08:03; 


Admin Dose 5 MG;  Start 8/2/19 at 15:00


Insulin Aspart (Novolog Insulin Pen) 5 unit WITH  MEALS SC  Last administered on


8/5/19at 12:11; Admin Dose 5 UNIT;  Start 8/2/19 at 17:35


Vancomycin HCl (Vanco Iv Per Pharmacy) VANCOMYCIN PER PHARMACY PER PROTOCOL XX ;


 Start 8/2/19 at 15:30


Povidone Iodine (Povidone-Iodine) 1 applic DAILY TOP ;  Start 8/3/19 at 09:00


Prochlorperazine (Compazine Inj) 5 mg PACU ORDER PRN IV NAUSEA/VOMITING;  Start 


8/3/19 at 08:00


Ferric Sodium Gluconate Complex 125 mg/Sodium Chloride 110 ml @  110 mls/hr 


DAILY@1300 IVPB  Last administered on 8/5/19at 12:12; Admin Dose 110 MLS/HR;  


Start 8/4/19 at 13:00;  Stop 8/8/19 at 13:59


Epoetin Rafi-epbx (Retacrit (Esrd)) 4,000 unit MoWeFr@1700 SC ;  Start 8/5/19 at


17:00


Morphine Sulfate (morphine) 2 mg Q4H  PRN IV SEVERE PAIN LEVEL 7-10 Last 


administered on 8/3/19at 17:55; Admin Dose 2 MG;  Start 8/3/19 at 15:00


Acetaminophen/ Hydrocodone Bitart (Norco (5/325)) 1 tab Q6H  PRN PO MODERATE 


PAIN LEVEL 4-6;  Start 8/3/19 at 15:00


Insulin Glargine (Lantus) 20 units BID SC  Last administered on 8/5/19at 08:04; 


Admin Dose 20 UNITS;  Start 8/3/19 at 21:00


Heparin Sodium (Porcine) (Heparin  (5000 Units/1ml)) 5,000 unit BID SC  Last 


administered on 8/5/19at 08:02; Admin Dose 5,000 UNIT;  Start 8/3/19 at 21:00


Insulin Aspart (Novolog Insulin Pen) (Adult SC Insulin - Moder... WITH MEALS  


BEDTIME SC  Last administered on 8/5/19at 12:12; Admin Dose 2 UNIT;  Start 


8/3/19 at 18:05


Cefazolin Sodium 50 ml @  100 mls/hr Q24H IVPB  Last administered on 8/4/19at 


21:19; Admin Dose 100 MLS/HR;  Start 8/4/19 at 22:00











MIKAEL LUCERO MD               Aug 5, 2019 13:57

## 2019-08-06 VITALS — DIASTOLIC BLOOD PRESSURE: 56 MMHG | SYSTOLIC BLOOD PRESSURE: 132 MMHG | HEART RATE: 86 BPM

## 2019-08-06 VITALS — DIASTOLIC BLOOD PRESSURE: 60 MMHG | SYSTOLIC BLOOD PRESSURE: 135 MMHG | HEART RATE: 88 BPM

## 2019-08-06 VITALS — RESPIRATION RATE: 18 BRPM | SYSTOLIC BLOOD PRESSURE: 134 MMHG | HEART RATE: 89 BPM | DIASTOLIC BLOOD PRESSURE: 78 MMHG

## 2019-08-06 VITALS — SYSTOLIC BLOOD PRESSURE: 136 MMHG | DIASTOLIC BLOOD PRESSURE: 52 MMHG | HEART RATE: 88 BPM

## 2019-08-06 VITALS — DIASTOLIC BLOOD PRESSURE: 61 MMHG | SYSTOLIC BLOOD PRESSURE: 143 MMHG | HEART RATE: 89 BPM

## 2019-08-06 VITALS — HEART RATE: 81 BPM | DIASTOLIC BLOOD PRESSURE: 70 MMHG | RESPIRATION RATE: 18 BRPM | SYSTOLIC BLOOD PRESSURE: 161 MMHG

## 2019-08-06 VITALS — DIASTOLIC BLOOD PRESSURE: 55 MMHG | HEART RATE: 87 BPM | SYSTOLIC BLOOD PRESSURE: 130 MMHG

## 2019-08-06 LAB
ADD MAN DIFF?: NO
ALANINE AMINOTRANSFERASE: 22 IU/L (ref 13–69)
ALBUMIN/GLOBULIN RATIO: 0.92
ALBUMIN: 3.8 G/DL (ref 3.3–4.9)
ALKALINE PHOSPHATASE: 94 IU/L (ref 42–121)
ANION GAP: 13 (ref 5–13)
ASPARTATE AMINO TRANSFERASE: 40 IU/L (ref 15–46)
BASOPHIL #: 0 10^3/UL (ref 0–0.1)
BASOPHILS %: 0.4 % (ref 0–2)
BILIRUBIN,DIRECT: 0 MG/DL (ref 0–0.2)
BILIRUBIN,TOTAL: 0.2 MG/DL (ref 0.2–1.3)
BLOOD UREA NITROGEN: 39 MG/DL (ref 7–20)
CALCIUM: 8.7 MG/DL (ref 8.4–10.2)
CARBON DIOXIDE: 25 MMOL/L (ref 21–31)
CHLORIDE: 102 MMOL/L (ref 97–110)
CREATININE: 5.83 MG/DL (ref 0.61–1.24)
EOSINOPHILS #: 0.2 10^3/UL (ref 0–0.5)
EOSINOPHILS %: 3 % (ref 0–7)
GLOBULIN: 4.1 G/DL (ref 1.3–3.2)
GLUCOSE: 129 MG/DL (ref 70–220)
HEMATOCRIT: 30 % (ref 42–52)
HEMOGLOBIN: 10.1 G/DL (ref 14–18)
IMMATURE GRANS #M: 0.25 10^3/UL (ref 0–0.03)
IMMATURE GRANS % (M): 3.2 % (ref 0–0.43)
LYMPHOCYTES #: 1.5 10^3/UL (ref 0.8–2.9)
LYMPHOCYTES %: 19.4 % (ref 15–51)
MAGNESIUM: 2.1 MG/DL (ref 1.7–2.5)
MEAN CORPUSCULAR HEMOGLOBIN: 30.1 PG (ref 29–33)
MEAN CORPUSCULAR HGB CONC: 33.7 G/DL (ref 32–37)
MEAN CORPUSCULAR VOLUME: 89.3 FL (ref 82–101)
MEAN PLATELET VOLUME: 10.6 FL (ref 7.4–10.4)
MONOCYTE #: 1 10^3/UL (ref 0.3–0.9)
MONOCYTES %: 13.2 % (ref 0–11)
NEUTROPHIL #: 4.7 10^3/UL (ref 1.6–7.5)
NEUTROPHILS %: 60.8 % (ref 39–77)
NUCLEATED RED BLOOD CELLS #: 0 10^3/UL (ref 0–0)
NUCLEATED RED BLOOD CELLS%: 0 /100WBC (ref 0–0)
PHOSPHORUS: 7 MG/DL (ref 2.5–4.9)
PLATELET COUNT: 141 10^3/UL (ref 140–415)
POTASSIUM: 3.6 MMOL/L (ref 3.5–5.1)
RED BLOOD COUNT: 3.36 10^6/UL (ref 4.7–6.1)
RED CELL DISTRIBUTION WIDTH: 13.6 % (ref 11.5–14.5)
SODIUM: 140 MMOL/L (ref 135–144)
TOTAL PROTEIN: 7.9 G/DL (ref 6.1–8.1)
WHITE BLOOD COUNT: 7.7 10^3/UL (ref 4.8–10.8)

## 2019-08-06 RX ADMIN — HEPARIN SODIUM SCH UNIT: 5000 INJECTION, SOLUTION INTRAVENOUS; SUBCUTANEOUS at 08:09

## 2019-08-06 RX ADMIN — FUROSEMIDE 1 MG: 40 TABLET ORAL at 08:11

## 2019-08-06 RX ADMIN — ZOLPIDEM TARTRATE 1 MG: 5 TABLET, FILM COATED ORAL at 01:42

## 2019-08-06 RX ADMIN — ACETAMINOPHEN 1 MG: 325 TABLET, FILM COATED ORAL at 03:05

## 2019-08-06 RX ADMIN — POVIDONE-IODINE SCH APPLIC: 100 OINTMENT TOPICAL at 08:18

## 2019-08-06 RX ADMIN — TIMOLOL MALEATE SCH DROP: 5 SOLUTION/ DROPS OPHTHALMIC at 08:10

## 2019-08-06 RX ADMIN — ZOLPIDEM TARTRATE PRN MG: 5 TABLET, FILM COATED ORAL at 01:42

## 2019-08-06 RX ADMIN — BRIMONIDINE TARTRATE SCH DROP: 2 SOLUTION OPHTHALMIC at 08:10

## 2019-08-06 RX ADMIN — INSULIN GLARGINE SCH UNITS: 100 INJECTION, SOLUTION SUBCUTANEOUS at 08:09

## 2019-08-06 RX ADMIN — INSULIN ASPART 1 UNIT: 100 INJECTION, SOLUTION INTRAVENOUS; SUBCUTANEOUS at 08:00

## 2019-08-06 RX ADMIN — DOCUSATE SODIUM 1 MG: 100 CAPSULE, LIQUID FILLED ORAL at 08:14

## 2019-08-06 RX ADMIN — INSULIN ASPART 1 UNIT: 100 INJECTION, SOLUTION INTRAVENOUS; SUBCUTANEOUS at 17:15

## 2019-08-06 RX ADMIN — COLLAGENASE SANTYL 1 APPLIC: 250 OINTMENT TOPICAL at 08:18

## 2019-08-06 RX ADMIN — COLLAGENASE SANTYL SCH APPLIC: 250 OINTMENT TOPICAL at 08:18

## 2019-08-06 RX ADMIN — DOCUSATE SODIUM SCH MG: 100 CAPSULE, LIQUID FILLED ORAL at 08:14

## 2019-08-06 RX ADMIN — INSULIN ASPART 1 UNIT: 100 INJECTION, SOLUTION INTRAVENOUS; SUBCUTANEOUS at 17:14

## 2019-08-06 RX ADMIN — SODIUM HYPOCHLORITE 1 APPLIC: 0.12 SOLUTION TOPICAL at 12:11

## 2019-08-06 RX ADMIN — CEFAZOLIN SCH MLS/HR: 1 INJECTION, POWDER, FOR SOLUTION INTRAMUSCULAR; INTRAVENOUS at 01:42

## 2019-08-06 RX ADMIN — POVIDONE-IODINE 1 APPLIC: 100 OINTMENT TOPICAL at 08:18

## 2019-08-06 RX ADMIN — INSULIN GLARGINE 1 UNITS: 100 INJECTION, SOLUTION SUBCUTANEOUS at 08:09

## 2019-08-06 RX ADMIN — PANTOPRAZOLE SODIUM SCH MG: 40 TABLET, DELAYED RELEASE ORAL at 05:30

## 2019-08-06 RX ADMIN — INSULIN ASPART 1 UNIT: 100 INJECTION, SOLUTION INTRAVENOUS; SUBCUTANEOUS at 12:09

## 2019-08-06 RX ADMIN — Medication 1 TAB: at 08:10

## 2019-08-06 RX ADMIN — TIMOLOL MALEATE 1 DROP: 5 SOLUTION/ DROPS OPHTHALMIC at 08:10

## 2019-08-06 RX ADMIN — AMLODIPINE BESYLATE SCH MG: 5 TABLET ORAL at 08:11

## 2019-08-06 RX ADMIN — INSULIN ASPART 1 UNIT: 100 INJECTION, SOLUTION INTRAVENOUS; SUBCUTANEOUS at 08:08

## 2019-08-06 RX ADMIN — BRIMONIDINE TARTRATE 1 DROP: 2 SOLUTION OPHTHALMIC at 08:10

## 2019-08-06 RX ADMIN — HEPARIN SODIUM 1 UNIT: 5000 INJECTION, SOLUTION INTRAVENOUS; SUBCUTANEOUS at 08:09

## 2019-08-06 RX ADMIN — LINAGLIPTIN 1 MG: 5 TABLET, FILM COATED ORAL at 08:10

## 2019-08-06 RX ADMIN — CEFAZOLIN 1 MLS/HR: 1 INJECTION, POWDER, FOR SOLUTION INTRAMUSCULAR; INTRAVENOUS at 01:42

## 2019-08-06 RX ADMIN — FUROSEMIDE SCH MG: 40 TABLET ORAL at 08:11

## 2019-08-06 RX ADMIN — PANTOPRAZOLE SODIUM 1 MG: 40 TABLET, DELAYED RELEASE ORAL at 05:30

## 2019-08-06 RX ADMIN — AMLODIPINE BESYLATE 1 MG: 5 TABLET ORAL at 08:11

## 2019-08-06 RX ADMIN — LINAGLIPTIN SCH MG: 5 TABLET, FILM COATED ORAL at 08:10

## 2019-08-06 RX ADMIN — INSULIN ASPART 1 UNIT: 100 INJECTION, SOLUTION INTRAVENOUS; SUBCUTANEOUS at 12:10

## 2019-08-06 NOTE — DS
DATE OF ADMISSION: 08/01/2019

DATE OF DISCHARGE: 08/06/2019

 

HISTORY OF PRESENT ILLNESS AND HOSPITAL COURSE:  This is a 59-year-old male with history of hypertens
ion, obesity, diabetes, history of end-stage renal disease on hemodialysis, sent from [facility] f
or evaluation of right foot edema.  The patient indicated that roughly 1 week developed redness and a
n ulcer on the right foot.  He was seen by vascular surgeon, Dr. Josue, and was sent into the ER for 
further evaluation.  On admission, the vital signs were stable.  The white count was 9.5, hemoglobin 
10.6, platelet count 259.  The patient was continued on hemodialysis.  During the hospitalization cou
[facility], the patient was seen by Dr. Josue who recommended calling podiatry.  Dr. Plata was called.  Th
e patient had an I and D of the right foot.  The patient had a right foot abscess.  Cultures were sen
t and cultures were positive for Staphylococcus aureus.  The patient was continued on vancomycin, on 
Ancef.  The patient was continued on hemodialysis.  The patient had strict blood glucose control.  Th
e patient's condition was stabilizing every day.  The patient was afebrile.  White count came down to
 7.7.  The patient also had an arterial study, which was negative for stenosis.  Had a foot MRI which
 was negative for osteo.  Currently, the patient is stable to be discharged home per ID.  The patient
 will continue with IV vancomycin at dialysis center for 2 weeks.

 

FINAL DISCHARGE DIAGNOSES:

1.  Status post abscess of right foot, I and D abscess.

2.  Positive culture for staph.

3.  Right foot cellulitis.

4.  Diabetes with peripheral neuropathy.

5.  History of right partial 3rd toe amputation.

6.  End-stage renal disease on hemodialysis.

7.  Anemia of chronic disease.

8.  Status post abdominal laparotomy.

9.  History of bilateral lenses.

10.  Left arm fistula.

 

DISCHARGE CONDITION:  Stable.

 

DISCHARGE DIET:  Renal, diabetic diet.

 

DISCHARGE MEDICATIONS:  Continue with home medications which were:

1.  Amlodipine 10.

2.  Atorvastatin 20.

3.  Colace 200.

4.  Iron.

5.  Lasix 40.

6.  Sliding scale minoxidil 10%.

7.  Multivitamin.

8.  Prilosec.

9.  Januvia 50.

10.  Lantus 20 b.i.d.

11.  Norco 1 tab p.o. q.6h.  p.r.n. pain.

12.  Colace p.r.n. constipation.

 

An order was faxed in for IV vancomycin to be sent at the dialysis center for 2 weeks, and home healt
h arrangement was made for wound care.  Patient will follow up with PCP in 1 to 2 weeks following hem
odialysis Monday, Wednesday, Friday.  Also, will follow with Dr. Plata next week.

 

 

Dictated By: MIKAEL CURITS/MOUNIKA

DD:    08/06/2019 14:53:48

DT:    08/06/2019 20:37:26

Conf#: [id number]

DID#:  [id number]

CC: CARL BROWN MD;*EndCC*

## 2019-08-06 NOTE — QN
Documentation


Comment


Pt feels better


cleared by Id and podiatry


will dc home


iv vanco with hd center











MIKAEL LUCERO MD               Aug 6, 2019 14:32

## 2019-08-06 NOTE — PDOCDIS
Discharge Instructions


DIAGNOSIS


Discharge Diagnosis


rt foot abscess s/p I AND D





CONDITION


                 [id number]
Patient Condition:  [id number]
Fair








HOME CARE INSTRUCTIONS:


            [id number]
Special Diet:  Kacei3k
diabetic renal diet








ACTIVITY:


     [id number]
Activity Restrictions:  [id number]
Slowly Increase Activity


                                        Rest between Activity








FOLLOW UP/APPOINTMENTS


Follow-up Plan


fu PCP in 1-2 weeks


fu Dr Nath in1-2 weeks


home health care











MIKAEL LUCERO MD               Aug 6, 2019 14:33

## 2019-08-06 NOTE — CONS
Assessment/Plan


Assessment/Plan


Hospital Course (Demo Recall)


All noted, looks comfortable, Tm 99.6





Wound culture grew oxacillin sensitive staph aureus.





Antimicrobials: Vancomycin and Ancef





Physical examination: Well-developed elderly man who is alert in no distress.  


Head atraumatic normocephalic.  Neck is supple.  Chest rise symmetrical.  Heart:


S1-S2 abdomen soft bowel sounds present





Assessment:


1.  Status post right foot abscess I&D


2.  End-stage renal disease, hemodialysis dependent


3.  Diabetes with diabetic neuropathy





Plan: Patient remains stable, anticipate dc on abx for 2 more weeks==> IV Vanco 


can be given at hemodialysis center or oral clindamycin to complete 2 weeks





Consultation Date/Type/Reason


Admit Date/Time


Aug 1, 2019 at 19:35


Initial Consult Date





Type of Consult


id


Date/Time of Note


DATE: 8/6/19 


TIME: 12:17





Exam/Review of Systems


Exam


Vitals





Vital Signs


  Date      Temp  Pulse  Resp  B/P (MAP)   Pulse Ox  O2          O2 Flow    FiO2


Time                                                 Delivery    Rate


    8/6/19  98.2     81    18      161/70        93


     07:55                          (100)


    8/5/19                                           Room Air


     21:40


    8/3/19                                                             2.0


     08:58








Intake and Output





8/5/19 8/5/19 8/6/19





1515:00


23:00


07:00





IntakeIntake Total


640 ml


240 ml


50 ml





OutputOutput Total


3000 ml





BalanceBalance


640 ml


240 ml


-2950 ml














Results


Result Diagram:  


8/6/19 0641                                                                     


          8/6/19 0641





Results 24hrs





Laboratory Tests


Test
                     8/5/19
17:16  8/5/19
20:49  8/6/19
01:55  8/6/19
06:41


Bedside Glucose                  176           193           129


White Blood Count                                                          7.7


Red Blood Count                                                          3.36  L


Hemoglobin                                                               10.1  L


Hematocrit                                                               30.0  L


Mean Corpuscular Volume                                                   89.3


Mean Corpuscular                                                          30.1


Hemoglobin


Mean Corpuscular          
             
             
                  33.7  



Hemoglobin
Concent


Red Cell Distribution                                                     13.6


Width


Platelet Count                                                            141  #


Mean Platelet Volume                                                     10.6  H


Immature Granulocytes %                                                 3.200  H


Neutrophils %                                                             60.8


Lymphocytes %                                                             19.4


Monocytes %                                                              13.2  H


Eosinophils %                                                              3.0


Basophils %                                                                0.4


Nucleated Red Blood                                                        0.0


Cells %


Immature Granulocytes #                                                 0.250  H


Neutrophils #                                                              4.7


Lymphocytes #                                                              1.5


Monocytes #                                                               1.0  H


Eosinophils #                                                              0.2


Basophils #                                                                0.0


Nucleated Red Blood                                                        0.0


Cells #


Sodium Level                                                               140


Potassium Level                                                            3.6


Chloride Level                                                             102


Carbon Dioxide Level                                                        25


Anion Gap                                                                   13


Blood Urea Nitrogen                                                        39  H


Creatinine                                                               5.83  H


Est Glomerular Filtrat    
             
             
                   10  L



Rate
mL/min


Glucose Level                                                              129


Calcium Level                                                              8.7


Phosphorus Level                                                          7.0  H


Magnesium Level                                                            2.1


Total Bilirubin                                                            0.2


Direct Bilirubin                                                          0.00


Indirect Bilirubin                                                         0.2


Aspartate Amino           
             
             
                    40  



Transf
(AST/SGOT)


Alanine                   
             
             
                    22  



Aminotransferase
(ALT/SG


PT)


Alkaline Phosphatase                                                        94


Total Protein                                                              7.9


Albumin                                                                    3.8


Globulin                                                                 4.10  H


Albumin/Globulin Ratio                                                    0.92


Test
                     8/6/19
08:06  8/6/19
12:07  
             



Bedside Glucose                  112           143








Medications


Medication





Current Medications


Diagnostic Test (Pha) (Accu-Chek) 1 ea 02 XX ;  Start 8/2/19 at 02:00;  Status 


Hold


Acetaminophen (Tylenol Tab) 650 mg Q6H  PRN PO MILD PAIN(1-3)OR ELEVATED TEMP 


Last administered on 8/6/19at 03:05; Admin Dose 650 MG;  Start 8/2/19 at 00:30


Zolpidem Tartrate (Ambien) 5 mg HS  PRN PO INSOMNIA Last administered on 


8/6/19at 01:42; Admin Dose 5 MG;  Start 8/2/19 at 00:30


Timolol Maleate (Timoptic 0.5%) 1 drop BID BOTH EYES  Last administered on 


8/6/19at 08:10; Admin Dose 1 DROP;  Start 8/2/19 at 09:00


Amlodipine Besylate (Norvasc) 5 mg DAILY PO  Last administered on 8/6/19at 


08:11; Admin Dose 5 MG;  Start 8/2/19 at 09:00


Atorvastatin Calcium (Lipitor) 20 mg QHS PO  Last administered on 8/5/19at 


20:54; Admin Dose 20 MG;  Start 8/2/19 at 21:00


Docusate Sodium (Colace) 200 mg DAILY PO  Last administered on 8/6/19at 08:14; 


Admin Dose 200 MG;  Start 8/2/19 at 09:00


Furosemide (Lasix) 40 mg DAILY PO  Last administered on 8/6/19at 08:11; Admin 


Dose 40 MG;  Start 8/2/19 at 09:00


Multivit/Ca Carb/ B Cmplx/FA/Prenat (Yen-Barbara) 1 tab DAILY PO  Last 


administered on 8/6/19at 08:10; Admin Dose 1 TAB;  Start 8/2/19 at 09:00


Brimonidine Tartrate (Alphagan 0.2%) 1 drop BID BOTH EYES  Last administered on 


8/6/19at 08:10; Admin Dose 1 DROP;  Start 8/3/19 at 21:00


Collagenase (Santyl) 1 applic DAILY TOP  Last administered on 8/2/19at 17:30; 


Admin Dose 1 APPLIC;  Start 8/2/19 at 11:30


Miscellaneous Information 1 ea NOTE XX ;  Start 8/2/19 at 11:30


Glucose (Glutose) 15 gm Q15M  PRN PO DECREASED GLUCOSE;  Start 8/2/19 at 11:30


Glucose (Glutose) 22.5 gm Q15M  PRN PO DECREASED GLUCOSE;  Start 8/2/19 at 11:30


Dextrose (D50w Syringe) 25 ml Q15M  PRN IV DECREASED GLUCOSE;  Start 8/2/19 at 


11:30


Dextrose (D50w Syringe) 50 ml Q15M  PRN IV DECREASED GLUCOSE;  Start 8/2/19 at 


11:30


Glucagon (Glucagen) 1 mg Q15M  PRN IM DECREASED GLUCOSE;  Start 8/2/19 at 11:30


Glucose (Glutose) 15 gm Q15M  PRN BUCCAL DECREASED GLUCOSE;  Start 8/2/19 at 


11:30


Pantoprazole (Protonix Tab) 40 mg DAILY@06 PO  Last administered on 8/6/19at 


05:30; Admin Dose 40 MG;  Start 8/2/19 at 16:00


Linagliptin (Tradjenta) 5 mg DAILY PO  Last administered on 8/6/19at 08:10; 


Admin Dose 5 MG;  Start 8/2/19 at 15:00


Insulin Aspart (Novolog Insulin Pen) 5 unit WITH  MEALS SC  Last administered on


8/6/19at 12:09; Admin Dose 5 UNIT;  Start 8/2/19 at 17:35


Vancomycin HCl (Vanco Iv Per Pharmacy) VANCOMYCIN PER PHARMACY PER PROTOCOL XX ;


 Start 8/2/19 at 15:30


Povidone Iodine (Povidone-Iodine) 1 applic DAILY TOP ;  Start 8/3/19 at 09:00


Prochlorperazine (Compazine Inj) 5 mg PACU ORDER PRN IV NAUSEA/VOMITING;  Start 


8/3/19 at 08:00


Epoetin Rafi-epbx (Retacrit (Esrd)) 4,000 unit MoWeFr@1700 SC  Last administered


on 8/5/19at 16:41; Admin Dose 4,000 UNIT;  Start 8/5/19 at 17:00


Morphine Sulfate (morphine) 2 mg Q4H  PRN IV SEVERE PAIN LEVEL 7-10 Last 


administered on 8/3/19at 17:55; Admin Dose 2 MG;  Start 8/3/19 at 15:00


Acetaminophen/ Hydrocodone Bitart (Norco (5/325)) 1 tab Q6H  PRN PO MODERATE 


PAIN LEVEL 4-6;  Start 8/3/19 at 15:00


Insulin Glargine (Lantus) 20 units BID SC  Last administered on 8/6/19at 08:09; 


Admin Dose 20 UNITS;  Start 8/3/19 at 21:00


Heparin Sodium (Porcine) (Heparin  (5000 Units/1ml)) 5,000 unit BID SC  Last 


administered on 8/6/19at 08:09; Admin Dose 5,000 UNIT;  Start 8/3/19 at 21:00


Insulin Aspart (Novolog Insulin Pen) (Adult SC Insulin - Moder... WITH MEALS  


BEDTIME SC  Last administered on 8/6/19at 12:10; Admin Dose 2 UNIT;  Start 


8/3/19 at 18:05


Cefazolin Sodium 50 ml @  100 mls/hr Q24H IVPB  Last administered on 8/6/19at 


01:42; Admin Dose 100 MLS/HR;  Start 8/4/19 at 22:00


Miscellaneous Information (*Rx Drug Level Order Reminder*) RANDOM VANCOMYCIN 


LEVEL ... 0500  ONCE XX ;  Start 8/7/19 at 05:00;  Stop 8/7/19 at 05:01


Sodium Hypochlorite (Dakins Diluted (1/40)) 1 applic DAILY TP  Last administered


on 8/6/19at 12:11; Admin Dose 1 APPLIC;  Start 8/6/19 at 12:00











LYNNETTE VALDERRAMA NP                  Aug 6, 2019 12:19